# Patient Record
Sex: FEMALE | Race: BLACK OR AFRICAN AMERICAN | NOT HISPANIC OR LATINO | Employment: FULL TIME | URBAN - METROPOLITAN AREA
[De-identification: names, ages, dates, MRNs, and addresses within clinical notes are randomized per-mention and may not be internally consistent; named-entity substitution may affect disease eponyms.]

---

## 2017-05-23 ENCOUNTER — AMBULATORY - HEALTHEAST (OUTPATIENT)
Dept: SURGERY | Facility: CLINIC | Age: 37
End: 2017-05-23

## 2017-05-23 DIAGNOSIS — Z98.84 S/P BARIATRIC SURGERY: ICD-10-CM

## 2017-05-23 DIAGNOSIS — K91.2 OTHER AND UNSPECIFIED POSTSURGICAL NONABSORPTION: ICD-10-CM

## 2017-05-23 DIAGNOSIS — K90.9 UNSPECIFIED INTESTINAL MALABSORPTION: ICD-10-CM

## 2017-06-21 ENCOUNTER — AMBULATORY - HEALTHEAST (OUTPATIENT)
Dept: SURGERY | Facility: CLINIC | Age: 37
End: 2017-06-21

## 2017-06-21 DIAGNOSIS — Z98.84 S/P GASTRIC BYPASS: ICD-10-CM

## 2017-08-31 ENCOUNTER — OFFICE VISIT - HEALTHEAST (OUTPATIENT)
Dept: SURGERY | Facility: CLINIC | Age: 37
End: 2017-08-31

## 2017-08-31 DIAGNOSIS — R79.89 LOW SERUM VITAMIN A: ICD-10-CM

## 2017-08-31 DIAGNOSIS — D50.9 IRON DEFICIENCY ANEMIA: ICD-10-CM

## 2017-08-31 DIAGNOSIS — K91.2 POSTOPERATIVE MALABSORPTION: ICD-10-CM

## 2017-08-31 DIAGNOSIS — Z98.84 HX OF GASTRIC BYPASS: ICD-10-CM

## 2017-08-31 ASSESSMENT — MIFFLIN-ST. JEOR: SCORE: 1417.51

## 2018-07-17 ENCOUNTER — AMBULATORY - HEALTHEAST (OUTPATIENT)
Dept: SURGERY | Facility: CLINIC | Age: 38
End: 2018-07-17

## 2018-07-17 DIAGNOSIS — Z98.84 S/P BARIATRIC SURGERY: ICD-10-CM

## 2018-07-17 DIAGNOSIS — K90.9 INTESTINAL MALABSORPTION, UNSPECIFIED TYPE: ICD-10-CM

## 2018-07-17 DIAGNOSIS — K91.2 POSTSURGICAL MALABSORPTION: ICD-10-CM

## 2018-08-08 ENCOUNTER — AMBULATORY - HEALTHEAST (OUTPATIENT)
Dept: SURGERY | Facility: CLINIC | Age: 38
End: 2018-08-08

## 2018-08-08 DIAGNOSIS — Z98.84 S/P GASTRIC BYPASS: ICD-10-CM

## 2018-08-16 ENCOUNTER — OFFICE VISIT - HEALTHEAST (OUTPATIENT)
Dept: SURGERY | Facility: CLINIC | Age: 38
End: 2018-08-16

## 2018-08-16 ENCOUNTER — AMBULATORY - HEALTHEAST (OUTPATIENT)
Dept: LAB | Facility: CLINIC | Age: 38
End: 2018-08-16

## 2018-08-16 DIAGNOSIS — Z98.84 HX OF GASTRIC BYPASS: ICD-10-CM

## 2018-08-16 DIAGNOSIS — K90.9 INTESTINAL MALABSORPTION, UNSPECIFIED TYPE: ICD-10-CM

## 2018-08-16 DIAGNOSIS — Z98.84 S/P BARIATRIC SURGERY: ICD-10-CM

## 2018-08-16 DIAGNOSIS — K91.2 POSTSURGICAL MALABSORPTION: ICD-10-CM

## 2018-08-16 DIAGNOSIS — K59.00 CONSTIPATION: ICD-10-CM

## 2018-08-16 DIAGNOSIS — K91.2 POSTOPERATIVE MALABSORPTION: ICD-10-CM

## 2018-08-16 LAB
ALBUMIN SERPL-MCNC: 3.7 G/DL (ref 3.5–5)
ALP SERPL-CCNC: 77 U/L (ref 45–120)
ALT SERPL W P-5'-P-CCNC: 52 U/L (ref 0–45)
ANION GAP SERPL CALCULATED.3IONS-SCNC: 10 MMOL/L (ref 5–18)
AST SERPL W P-5'-P-CCNC: 49 U/L (ref 0–40)
BILIRUB SERPL-MCNC: 0.3 MG/DL (ref 0–1)
BUN SERPL-MCNC: 18 MG/DL (ref 8–22)
CALCIUM SERPL-MCNC: 9.4 MG/DL (ref 8.5–10.5)
CHLORIDE BLD-SCNC: 104 MMOL/L (ref 98–107)
CHOLEST SERPL-MCNC: 137 MG/DL
CO2 SERPL-SCNC: 25 MMOL/L (ref 22–31)
CREAT SERPL-MCNC: 0.74 MG/DL (ref 0.6–1.1)
ERYTHROCYTE [DISTWIDTH] IN BLOOD BY AUTOMATED COUNT: 12.9 % (ref 11–14.5)
FASTING STATUS PATIENT QL REPORTED: NORMAL
FERRITIN SERPL-MCNC: 23 NG/ML (ref 10–130)
FOLATE SERPL-MCNC: 14.7 NG/ML
GFR SERPL CREATININE-BSD FRML MDRD: >60 ML/MIN/1.73M2
GLUCOSE BLD-MCNC: 78 MG/DL (ref 70–125)
HBA1C MFR BLD: 5.2 % (ref 4.2–6.1)
HCT VFR BLD AUTO: 38.9 % (ref 35–47)
HDLC SERPL-MCNC: 60 MG/DL
HGB BLD-MCNC: 13.2 G/DL (ref 12–16)
LDLC SERPL CALC-MCNC: 68 MG/DL
MCH RBC QN AUTO: 29.8 PG (ref 27–34)
MCHC RBC AUTO-ENTMCNC: 33.9 G/DL (ref 32–36)
MCV RBC AUTO: 88 FL (ref 80–100)
PLATELET # BLD AUTO: 237 THOU/UL (ref 140–440)
PMV BLD AUTO: 10.8 FL (ref 8.5–12.5)
POTASSIUM BLD-SCNC: 3.8 MMOL/L (ref 3.5–5)
PROT SERPL-MCNC: 6.7 G/DL (ref 6–8)
PTH-INTACT SERPL-MCNC: 98 PG/ML (ref 10–86)
RBC # BLD AUTO: 4.43 MILL/UL (ref 3.8–5.4)
SODIUM SERPL-SCNC: 139 MMOL/L (ref 136–145)
TRIGL SERPL-MCNC: 44 MG/DL
VIT B12 SERPL-MCNC: 1860 PG/ML (ref 213–816)
WBC: 6.3 THOU/UL (ref 4–11)

## 2018-08-16 ASSESSMENT — MIFFLIN-ST. JEOR: SCORE: 1412.97

## 2018-08-17 LAB — 25(OH)D3 SERPL-MCNC: 43.2 NG/ML (ref 30–80)

## 2018-08-18 LAB
ANNOTATION COMMENT IMP: ABNORMAL
VIT A SERPL-MCNC: 0.21 MG/L (ref 0.3–1.2)
VITAMIN A (RETINYL PALMITATE): <0.02 MG/L (ref 0–0.1)
ZINC SERPL-MCNC: 73 UG/DL (ref 60–120)

## 2018-08-22 LAB — VIT B1 PYROPHOSHATE BLD-SCNC: 127 NMOL/L (ref 70–180)

## 2018-08-28 ENCOUNTER — AMBULATORY - HEALTHEAST (OUTPATIENT)
Dept: SURGERY | Facility: CLINIC | Age: 38
End: 2018-08-28

## 2018-08-28 DIAGNOSIS — R79.89 LOW SERUM VITAMIN A: ICD-10-CM

## 2018-08-28 DIAGNOSIS — K91.2 POSTOPERATIVE MALABSORPTION: ICD-10-CM

## 2020-08-18 ENCOUNTER — AMBULATORY - HEALTHEAST (OUTPATIENT)
Dept: SURGERY | Facility: CLINIC | Age: 40
End: 2020-08-18

## 2020-08-18 DIAGNOSIS — R79.89 LOW VITAMIN D LEVEL: ICD-10-CM

## 2020-08-18 DIAGNOSIS — K90.9 INTESTINAL MALABSORPTION: ICD-10-CM

## 2020-08-18 DIAGNOSIS — K91.2 POSTOPERATIVE MALABSORPTION: ICD-10-CM

## 2020-08-18 DIAGNOSIS — R79.89 LOW SERUM VITAMIN A: ICD-10-CM

## 2020-08-18 DIAGNOSIS — Z98.84 HX OF GASTRIC BYPASS: ICD-10-CM

## 2020-08-18 DIAGNOSIS — E60 ZINC DEFICIENCY: ICD-10-CM

## 2021-03-12 ENCOUNTER — OFFICE VISIT - HEALTHEAST (OUTPATIENT)
Dept: SURGERY | Facility: CLINIC | Age: 41
End: 2021-03-12

## 2021-03-12 DIAGNOSIS — Z98.84 HX OF GASTRIC BYPASS: ICD-10-CM

## 2021-03-12 DIAGNOSIS — Z98.84 BARIATRIC SURGERY STATUS: ICD-10-CM

## 2021-03-12 DIAGNOSIS — E66.3 OVERWEIGHT (BMI 25.0-29.9): ICD-10-CM

## 2021-03-12 DIAGNOSIS — K59.00 CONSTIPATION, UNSPECIFIED CONSTIPATION TYPE: ICD-10-CM

## 2021-03-12 DIAGNOSIS — K91.2 POSTOPERATIVE MALABSORPTION: ICD-10-CM

## 2021-03-12 ASSESSMENT — MIFFLIN-ST. JEOR: SCORE: 1444.72

## 2021-04-28 ENCOUNTER — AMBULATORY - HEALTHEAST (OUTPATIENT)
Dept: LAB | Facility: CLINIC | Age: 41
End: 2021-04-28

## 2021-04-28 DIAGNOSIS — K91.2 POSTOPERATIVE MALABSORPTION: ICD-10-CM

## 2021-04-28 DIAGNOSIS — Z98.84 HX OF GASTRIC BYPASS: ICD-10-CM

## 2021-04-28 LAB
ALBUMIN SERPL-MCNC: 3.8 G/DL (ref 3.5–5)
ALP SERPL-CCNC: 63 U/L (ref 45–120)
ALT SERPL W P-5'-P-CCNC: 44 U/L (ref 0–45)
ANION GAP SERPL CALCULATED.3IONS-SCNC: 9 MMOL/L (ref 5–18)
AST SERPL W P-5'-P-CCNC: 44 U/L (ref 0–40)
BILIRUB SERPL-MCNC: 0.4 MG/DL (ref 0–1)
BUN SERPL-MCNC: 16 MG/DL (ref 8–22)
CALCIUM SERPL-MCNC: 9.1 MG/DL (ref 8.5–10.5)
CHLORIDE BLD-SCNC: 107 MMOL/L (ref 98–107)
CO2 SERPL-SCNC: 25 MMOL/L (ref 22–31)
CREAT SERPL-MCNC: 0.79 MG/DL (ref 0.6–1.1)
ERYTHROCYTE [DISTWIDTH] IN BLOOD BY AUTOMATED COUNT: 13 % (ref 11–14.5)
FERRITIN SERPL-MCNC: 16 NG/ML (ref 10–130)
FOLATE SERPL-MCNC: 8.3 NG/ML
GFR SERPL CREATININE-BSD FRML MDRD: >60 ML/MIN/1.73M2
GLUCOSE BLD-MCNC: 69 MG/DL (ref 70–125)
HBA1C MFR BLD: 5.1 %
HCT VFR BLD AUTO: 37.6 % (ref 35–47)
HGB BLD-MCNC: 12.4 G/DL (ref 12–16)
MAGNESIUM SERPL-MCNC: 1.5 MG/DL (ref 1.8–2.6)
MCH RBC QN AUTO: 29 PG (ref 27–34)
MCHC RBC AUTO-ENTMCNC: 33 G/DL (ref 32–36)
MCV RBC AUTO: 88 FL (ref 80–100)
PLATELET # BLD AUTO: 212 THOU/UL (ref 140–440)
PMV BLD AUTO: 10.4 FL (ref 7–10)
POTASSIUM BLD-SCNC: 4.2 MMOL/L (ref 3.5–5)
PROT SERPL-MCNC: 6.4 G/DL (ref 6–8)
PTH-INTACT SERPL-MCNC: 61 PG/ML (ref 10–86)
RBC # BLD AUTO: 4.27 MILL/UL (ref 3.8–5.4)
SODIUM SERPL-SCNC: 141 MMOL/L (ref 136–145)
TSH SERPL DL<=0.005 MIU/L-ACNC: 0.86 UIU/ML (ref 0.3–5)
VIT B12 SERPL-MCNC: 368 PG/ML (ref 213–816)
WBC: 5.1 THOU/UL (ref 4–11)

## 2021-04-29 LAB — 25(OH)D3 SERPL-MCNC: 45.6 NG/ML (ref 30–80)

## 2021-05-01 LAB
COPPER SERPL-MCNC: 97.9 UG/DL (ref 80–155)
ZINC SERPL-MCNC: 67.3 UG/DL (ref 60–120)

## 2021-05-02 LAB
ANNOTATION COMMENT IMP: ABNORMAL
VIT A SERPL-MCNC: 0.23 MG/L (ref 0.3–1.2)
VITAMIN A (RETINYL PALMITATE): <0.02 MG/L (ref 0–0.1)

## 2021-05-03 LAB — VIT B1 PYROPHOSHATE BLD-SCNC: 102 NMOL/L (ref 70–180)

## 2021-05-06 ENCOUNTER — AMBULATORY - HEALTHEAST (OUTPATIENT)
Dept: SURGERY | Facility: CLINIC | Age: 41
End: 2021-05-06

## 2021-05-06 DIAGNOSIS — K91.2 POSTOPERATIVE MALABSORPTION: ICD-10-CM

## 2021-05-06 DIAGNOSIS — E50.9 VITAMIN A DEFICIENCY: ICD-10-CM

## 2021-05-24 ENCOUNTER — RECORDS - HEALTHEAST (OUTPATIENT)
Dept: ADMINISTRATIVE | Facility: CLINIC | Age: 41
End: 2021-05-24

## 2021-05-30 ENCOUNTER — RECORDS - HEALTHEAST (OUTPATIENT)
Dept: ADMINISTRATIVE | Facility: CLINIC | Age: 41
End: 2021-05-30

## 2021-05-31 VITALS — HEIGHT: 64 IN | BODY MASS INDEX: 28.51 KG/M2 | WEIGHT: 167 LBS

## 2021-06-01 VITALS — HEIGHT: 64 IN | WEIGHT: 166 LBS | BODY MASS INDEX: 28.34 KG/M2

## 2021-06-05 VITALS — HEIGHT: 64 IN | BODY MASS INDEX: 29.53 KG/M2 | WEIGHT: 173 LBS

## 2021-06-10 NOTE — PROGRESS NOTES
Patient called and is going to her PCP for follow-up and would like her bariatric labs sent so she can have drawn at that appointment.  Labs faxed to Jeimy Abad CNP's office at [phone].  Millie Romeo RN

## 2021-06-10 NOTE — PROGRESS NOTES
Pt called in with complaints of feeling really tired and wondering if her iron or vitamin D levels are low.  She saw  in August of 2016 with a full set of bariatric post op labs and was supposed to have her vit D, PTH and vitamin A levels rechecked in November but she lost the orders.  She also needs a f/u appt and will check with her insurance company first because she had to pay out of pocket last time.  I let her know that I will mail her lab orders to have a full set of bariatric labs checked as it's almost time for that.  Pt verbalized understanding and plans to have the labs drawn at her PCP clinic.      Beatriz Rivas RN, N  [facility]  P [phone]  F [phone]

## 2021-06-12 NOTE — PROGRESS NOTES
Bariatric Care Clinic Follow Up Visit for Previous Bariatric Surgery   Date of visit: 8/31/2017  Physician: Babatunde More MD  Primary Care is Jeimy Abad CNP.  Kaci Gavin   37 y.o.  female    Date of Surgery: 2/5/07  Initial Weight: 254 lbs  Initial BMI: na  Today's Weight:   Wt Readings from Last 1 Encounters:   08/31/17 167 lb (75.8 kg)     Body mass index is 28.67 kg/(m^2).  Weight: 167 lb (75.8 kg)     Assessment and Plan   Assessment: Kaci is a 37 y.o. year old female who is 10 years s/p  Gilma en Y Gastric Bypass with Dr. Taylor.  She has had a durable weight loss of 87 lbs since surgery.  Overall compliance with the [facility] has been recently improved.  She suffers from recurrent iron def anemia in combination due to lack of TWICE daily multivitamin with iron use and ongoing menses (much improved post ablation but still getting 2day menses monthly).  She's excelled in her exercise over the last year and has lost nearly 25 lbs through optimizing her bariatric technique and exercise regimen. She should be quite proud/pleased with her weight where it's at and she's enjoying excellent results. She's interested in arm skin removal and we discussed following up with plastics if wishing to investigate more.   .  Kaci Gavin feels that she has achieved her   preoperative goals for bariatric surgery.    Plan:  1. To optimize your iron levels, take your complete multivitamin with at least 18mg of iron TWICE a day (you could also look for a prenatal vitamin which would have 28mg of iron or more per serving and also take TWICE daily).   2. Keep using your body. This is a great weight for you.  3. If interested in skin removal from the arms/thighs, consider consulting with plastic surgery.      No diagnosis found.    No Follow-up on file.     Bariatric Surgery Review   Interim History/LifeChanges: working out a ton.    Patient Concerns: some low iron issues    Medication changes:  "none.     Vitamin Intake:   Multivitamin   only once daily mostly   Vitamin D  yes   Calcium  yes   Vit. B-12    yes     Habits:            Alcohol Intake  rare.   NSAID Use  avoids   Caffeine Use  no   Exercise  gym workouts every day mostly   CPAP Use:  no   Birth Control  Ablation, 2 days of menses             MBSAQIP  Surgeon: NATALI Taylor MD  Anticoag for PE/DVT since last visit: No  Patient complains of hernia: No  Readmit since last visit: No  Reoperation since last visit: No  Vomiting: Not at all  GERD req medication: No  Sleep Apnea: No  Hypertension req meds: No  Hyperlipidemia req meds: No  Diabetes: No    Symptoms  Hair Loss: Yes (has alopecia)  Reactive Hypoglycemia: (P) No  Abdominal Pain: (P) No  Nausea: (P) No  Heartburn: (P) No  Constipation: (P) Yes  Diarrhea: (P) No  Trouble Breathing or Chest Pain: (P) No  Leg Swelling: (P) No  Skin rashes under folds: (P) No                         LABS: \"Reviewed      LABS:  Lab Results   Component Value Date    WBC 7.4 08/08/2016    HGB 12.8 08/08/2016    HCT 37.8 08/08/2016    MCV 88 08/08/2016     08/08/2016      Lab Results   Component Value Date    IQLQQVNA16FS 22.8 (L) 08/08/2016    No results found for: HGBA1C   No results found for: CHOL Lab Results   Component Value Date     (H) 08/08/2016         Lab Results   Component Value Date    FERRITIN 35 08/08/2016      No results found for: HDL   Lab Results   Component Value Date    WZLRANHY34 342 08/08/2016    Lab Results   Component Value Date    40697 130 08/08/2016      No results found for: LDLCALC No results found for: TSH Lab Results   Component Value Date    FOLATE 11.7 08/08/2016      No results found for: TRIG Lab Results   Component Value Date    ALT 35 08/08/2016    AST 32 08/08/2016    ALKPHOS 74 08/08/2016    BILITOT 0.3 08/08/2016    No results found for: TESTOSTERONE     No components found for: CHOLHDL Lab Results   Component Value Date    7597 15.4 (L) 08/08/2016      " "@[facility](vitamin a: 1)@          Patient Profile   Social History     Social History Narrative     No narrative on file        Past Medical History   No past medical history on file.  Patient Active Problem List   Diagnosis     Hx of gastric bypass     Postoperative malabsorption     Low vitamin D level     Low serum vitamin A     Zinc deficiency     Secondary hyperparathyroidism, non-renal     Current Outpatient Prescriptions   Medication Sig Note     betamethasone dipropionate (DIPROLENE) 0.05 % ointment Apply topically 2 (two) times a day.      BIOTIN ORAL Take 1 tablet by mouth daily.       calcium citrate-vitamin D3 500 mg calcium -400 unit Chew Chew 1 tablet daily.      cholecalciferol, vitamin D3, (VITAMIN D3) 5,000 unit Tab Take 1 tablet by mouth daily.      cyanocobalamin, vitamin B-12, 1,000 mcg Subl Place 1,000 mcg under the tongue daily.      ferrous sulfate 325 (65 FE) MG tablet Take 1 tablet by mouth daily. 8/31/2017: Received from: External Pharmacy Received Sig: TK 1 T PO  ONCE D WITH A MEAL     MULTIVITS,CA,MINERALS/IRON/FA (ONE-A-DAY WOMENS FORMULA ORAL) Take 1 tablet by mouth daily.      SUMAtriptan (IMITREX) 25 MG tablet Take 25 mg by mouth every 2 (two) hours as needed for migraine.      VITAMIN C 250 MG tablet Take 1 tablet by mouth daily. 8/31/2017: Received from: External Pharmacy Received Sig: TK 1 T PO ONCE DAILY TO BE TAKEN WITH IRON SUPPLEMENT FO RIMPROVED ABSORPTION       Past Surgical History  She has a past surgical history that includes Gilma-en-y procedure (02/05/2007); Endometrial ablation; and Tubal ligation.     Examination   /56  Pulse 63  Temp 98.7  F (37.1  C)  Resp 16  Ht 5' 4\" (1.626 m)  Wt 167 lb (75.8 kg)  BMI 28.67 kg/m2  Height: 5' 4\" (1.626 m) (8/31/2017  3:29 PM)  Weight: 167 lb (75.8 kg) (8/31/2017  3:29 PM)  BMI (Calculated): 28.7 (8/31/2017  3:29 PM)  Body fat percentage: 29.8 (8/31/2017  3:29 PM)  NSAIDS: No (8/31/2017  3:29 PM)  Pain Scale: 0 " (8/31/2017  3:29 PM)  General:  Alert and ambulatory,   HEENT:  No conjunctival pallor, moist mucous Membranes, neck is supple.  Pulmonary:  Normal respiratory effort, no cough, no audible wheezes/crackles.  CV:  Regular rate and Rhythm, no murmurs, pulses 2 plus  Abdominal: soft, non tender. No guarding.  Extremities: loose tricep skin. No rash.  Skin:  Warm/dry, no pallor  Pscyh/Mood: pleasant, happy and fit.         Counseling:   We reviewed the important post op bariatric recommendations:  -eating 3 meals daily  -eating protein first, getting >60gm protein daily  -eating slowly, chewing food well  -avoiding/limiting calorie containing beverages  -drinking water 15-30 minutes before or after meals  -limiting restaurant or cafeteria eating to twice a week or less    We discussed the importance of restorative sleep and stress management in maintaining a healthy weight.  We discussed the National Weight Control Registry healthy weight maintenance strategies and ways to optimize metabolism.  We discussed the importance of physical activity including cardiovascular and strength training in maintaining a healthier weight.  We discussed the importance of life-long vitamin supplementation and life-long  follow-up.    Kaci was reminded that, to avoid marginal ulcers she should avoid tobacco at all, alcohol in excess, caffeine in excess, and NSAIDS (unless indicated for cardioprotection or othewise and opposed by a PPI).    At least 25 minutes was spent in direct consultation and over 50% of the time devoted to counseling regarding maximizing the benefits of her previous bariatric surgery while minimizing risks of nutritional or structural complications.    Babatunde More MD  [facility].  8/31/2017  3:55 PM

## 2021-06-15 NOTE — PROGRESS NOTES
"   Kaci Gavin is a 40 y.o. female who is being evaluated via a billable video visit.       How would you like to obtain your AVS? MyChart.  If dropped from the video visit, the video invitation should be resent by: Send to e-mail at: [email or web address]  Will anyone else be joining your video visit? No     Video Start Time: 11:00 am      Post-op Surgical Weight Loss Diet Evaluation     Assessment:  Pt presents for 14 year post-op RD visit, s/p RYGB on 2/5/07 with Dr. aTylor. Today we reviewed current eating habits and level of physical activity, and instructed on the changes that are required for successful bariatric outcomes.    Patient Progress: She has maintained a 70 lb weight loss since surgery. Overall, she is doing well with great food intake and exercise regimen. She has some nutrition questions today.    Pt's Initial Weight: 243 lbs  Weight: 173 lb (78.5 kg)  Weight loss from initial: 70  % Weight loss: 28.81 %  Weight (Patient Reported): 173 lb (78.5 kg)  Height (Patient Reported): 5' 4\" (1.626 m)  BMI (Based on Pt Reported Ht/Wt): 29.7      There is no height or weight on file to calculate BMI.    Patient Active Problem List   Diagnosis     Hx of gastric bypass     Postoperative malabsorption     Low vitamin D level     Low serum vitamin A     Zinc deficiency       Diabetes No    Vitamins   Reviewed with doctor today.    Constipation: yes    Diet Recall/Time:   We discussed nutrition goals for calories and protein that would help support her toward her overall goals.   Breakfast: Banana, 2 over easy eggs, green peppers, mushrooms, and breakfast sausage meat  Lunch: tuna, low carb tortilla, cheese, handful of blueberries  Dinner: Turkey burger zac, broccoli and sweet potatoes    Typical Snacks: Keto muffins or greek yogurt with flax seed and blueberries    Exercise: She works out 6 days per week, HIIT classes and Body Pump Class      PES statement:      (NC-1.4) Altered GI Function related to " "Alteration in gastrointestinal tract structure and/or function/ Decreased functional length of the GI tract as evidenced by Weight loss of 28.81% initial body weight; Gastric bypass surgery.    Intervention    Discussion  1. Discussed Post-Op Nutritional Guidelines for RYGB  2. Recommended to consume 15-20gm protein at 3 meals daily, along with protein supplement/\"planned protein containing snack\" of 15-30gm protein, to reach goal of 60-80 gm protein daily.  3. Educated on post-op vitamin regimen: Multi Vit + iron 2x/day, calcium citrate 400-600 mg 2x/day, 4846-8784 mcg of Sublingual B-12 daily, and 5000 IU Vitamin D3 daily (MVI and calcium can be taken at the same time BID)  4. Reviewed lean protein sources    Instructions  1. Include 15-20gm protein at each meal, along with protein supplement/\"planned protein containing snack\" of 15-30gm protein, to reach goal of 60-80 gm protein daily.  2. Increase fluid intake to 64oz daily: choose plain or calorie/carbonation-free beverages.  3. Incorporate daily structured activity, 30-60 minutes most days of the week  4. Recommended pt to start taking: Multi Vit + iron 2x/day, calcium citrate 400-600 mg 2x/day, 9027-8438 mcg of Sublingual B-12 daily, and 5000 IU Vitamin D3 daily. (MVI and calcium can be taken at the same time)  5. Read food labels more consistently: keeping total fat grams <10, total sugar grams <10, fiber >3gm per serving.  6. Increase vegetable/fruit intake, by having a vegetable or fruit with each meal daily.  7. Practice plate method: 1/2 plate lean/low fat protein source, vegetable/fruit, <25% of plate complex carbohydrates.  8. Separate fluids 30 minutes before/after meal times.  9. Practice eating off of smaller plates/bowls, chewing to applesauce consistency, taking 20-30 minutes to eat in a calm/relaxed environment without distractions of tv/email/cell phone.    Assessment/Plan:    Pt to follow up for 1 year  post-op visit with bariatrician "     Video-Visit Details    Type of service:  Video Visit    Video End Time (time video stopped): 11:24 am  Originating Location (pt. Location): Home    Distant Location (provider location):  [facility] [place]     Platform used for Video Visit: Kallie

## 2021-06-15 NOTE — PATIENT INSTRUCTIONS - HE
Plan:  1. Great work on maintaining a 70 lb weight reduction over the last 14 years. Continue good, mindful eating of protein first at 3 meals daily, chewing thoroughly and  beverages from meals.   2. Vitamin labs can be done this week:  [phone] to schedule at [facility].  3. Constipation plan:  To empty out you can use the dulcolax suppository once or twice daily for 2 days, or in the future if difficult emptying your bowels (use sparingly).  Once emptied, to help maintain good bowel movements, aim for 64-80 oz of water daily. After supper have a glass of metamucil and right before bed have 10ml Magnesium citrate.  You could consider also 2 prunes nightly before brushing your teeth.  4. Try 2 weeks of dairy free to see if gas improves. If so limit dairy or use Lactaid to aid digestion of dairy products. Cheese is usually fine, but for these next 2 weeks, no cheese.  5. Given constipating nature of iron sulfate, you could consider using Ferritt's Liquid Iron instead, iron protein succinylate, 45mg/serving for a month. We'll check iron levels with labs and adjust recommendations. The multivitamin regimen you're on is half of usual recommendations so I think as your multivitamin gets more regular (twice daily dosing or a Bariatric Specific brand like Celebrate One 45 or Bariatric Advantage Ultra Solo with Iron) your need for extra iron should go away as these provide 45mg of iron per day.  Message me if/when you make the switch.  6. Continue annual visits.  7. Continue excellent workout regimen.    [facility]ariat[facility]  Nutritional Guidelines  Gastric Bypass 18 Months Post Op and Beyond    General Guidelines and Helpful Hints:    Eat 3 meals per day + protein supplement(s). No snacks between meals.  o Do not skip meals.  This can cause overeating at the next meal and will prevent adequate protein and nutritional intake.    Aim for 60-80 grams of protein per day.  o Always eat your protein first.  This assists with optimal nutrition and helps you stay full longer.  o Depending on your portion size, you may need to drink approved protein supplement between meals to achieve protein goals. Follow recommendations of your Dietitian.     Eat your protein first, and then follow with fiber.   o It is not necessary to count your fiber, but 15-20 grams per day is recommended.    o Add fiber by including fruits, vegetables, whole grains, and beans.     Portions should remain about 1 cup per meal. Use measuring cups to be accurate.    Continue to use saucer/salad plates, infant/toddler silverware to keep portion sizes small and take small bites.    Eat S-L-O-W-L-Y to make each meal last 20-30 minutes. Always stop eating when satisfied.    Continue to use caution with foods containing skins, peels or membranes. Chew well!    Aim for 64 oz. of calorie-free fluids daily.  o Continue to avoid caffeine and carbonation. If you choose to drink alcohol, do so in moderation.   o Remember to avoid drinking during meals, 15-30 minutes before and 30 minutes after.    Exercise is odonnell for continued weight loss and weight maintenance. Aim for 30-60 minutes of physical activity most days of the week. Include cardiovascular and strength training.    If having trouble tolerating meat, try using a crock-pot, tinfoil tent, steamer or other moist cooking method to create tender meats. Add broth or low-fat gravy to help meat stay moist.     Avoid high sugar and high fat foods to prevent dumping syndrome.  o Check nutrition labels for less than 10 grams of sugar and less than 10 grams of fat per serving.    Continue Taking Vitamins/Minerals:  o 1965-8384 mcg of Sublingual B-12 daily  o 1 Multivitamin with Iron twice daily (chewable or swallow tabs)  o 500-600 mg Calcium Citrate twice daily (chewable or swallow tabs)  o 5000 IU Vitamin D3 daily    Sample Grocery List    Protein:    Fat free Greek or light yogurt (less than 10 grams  sugar)    Fat free or low-fat cottage cheese    String cheese or reduced fat cheese slices    Tuna, salmon, crab, egg, or chicken salad made with light or fat free mayonnaise    Egg or Egg Substitute    Lean/extra lean turkey, beef, bison, venison (ground, sirloin, round, flank)    Pork loin or tenderloin (grilled, baked, broiled)    Fish such as salmon, tuna, trout, tilapia, etc. (grilled, baked, broiled)    Tender cuts of lean (skinless) turkey or chicken    Lean deli meats: turkey, lean ham, chicken, lean roast beef    Beans such as kidney, garbanzo, black, gallardo, or low-fat/fat free refried beans    Peanut butter (natural preferred). Limit to 1 Tbsp. per day.    Low-fat meatloaf (made with lean ground beef or turkey)    Sloppy Joes made with low-sugar ketchup and lean ground beef or turkey    Soy or vegetable protein (i.e. vegan crumbles, soy/veggie burger, tofu)    Hummus    Vegetables:    Fresh: cooked or raw (as tolerated)    Frozen vegetables    Canned vegetables (low sodium or no salt added, rinse before cooking/eating)    (Ok to have skins/peels/membranes/seeds - just chew well)    Fruits:    Fresh fruit    Frozen fruit (no sugar added)    Canned fruit (packed in its own juice, NOT syrup)    (Ok to have skins/peels/membranes/seeds - just chew well)    Starch:    Unsweetened whole-grain hot cereal (or high fiber cold cereal, dry)    Toasted whole wheat bread or [place] Thins    Whole grain crackers    Baked /boiled/mashed potato/sweet potato    Cooked whole grain pasta, brown rice, or other cooked whole grains    Starchy vegetables: corn, peas, winter squash    Protein Supplement:     Ready to drink protein shake with:  o 15-30 grams protein per serving  o Less than 10 grams total carbohydrate per serving     Protein powder mixed with:  o  Skim or 1% milk  o Low fat or fat free Lactaid milk, plain or no sugar added soymilk  o Water     Fats: (use in moderation)    1 teaspoon of soft tub margarine    1  teaspoon olive oil, canola oil, or peanut oil    1 tablespoon of low-fat garcia or salad dressing     Sample Menu for 18+ months after Gastric Bypass    You do NOT need to eat/drink the full portion sizes listed below  Always stop when you are satisfied    Breakfast   cup 1% cottage cheese     cup mixed berries   Lunch 2 oz lean roast beef on   [place] Thin with 1 tsp. light garcia    small tomato, chopped, mixed with 1 tsp. light vinaigrette dressing   Supplement Approved protein supplement (if needed between meals)   Dinner 2 oz grilled salmon    cup salad greens with 1 tsp. light salad dressing and 1 tsp. ground flax seed    cup quinoa or brown rice     Breakfast   cup egg substitute with   cup sautéed chopped vegetables  2 light [place] Krisp crackers   Lunch Tuna Melt:   cup tuna mixed with 1 tsp. light garcia over   [place] Thin. Top with 2-3 slices cucumber and 1 oz slice of low fat cheese   Supplement 1 cup skim milk (if needed between meals)   Dinner 3 oz  grilled, broiled, or baked seasoned skinless chicken breast    cup asparagus     Breakfast   cup plain oatmeal made with skim or 1% milk with 1 Tbsp. flavored/unflavored protein powder added  1 mozzarella string cheese   Lunch 2 oz deli turkey breast  1/3 cup salad with 1 tsp. light salad dressing, 1/8 of a whole avocado and 1 Tbsp. sunflower seeds   Dinner 3 oz. pork loin made in a crock pot, seasoned with a spice rub    cup cooked carrots   Supplement Approved protein supplement (if needed between meals)     Breakfast 1 cup breakfast casserole made with egg substitute, turkey sausage,  and steamed, chopped bell peppers   Supplement  1 cup light Greek yogurt (if needed between meals)   Lunch 2 oz. teriyaki turkey    cup mashed sweet potato with 1-2 spritzes of spray butter (like [place])    cup fresh pineapple   Dinner 3 oz low fat meatloaf    cup roasted garlic zucchini     Breakfast   cup leftover breakfast casserole    cup no sugar added applesauce with 1  Tbsp. unflavored protein powder and a sprinkle of cinnamon    Lunch 3 oz shrimp with 1-2 Tbsp. low-sugar cocktail sauce for dipping    c. whole wheat pasta drizzled with   tsp. olive oil   Supplement 1 cup skim/1% milk with scoop of protein powder (if needed between meals)   Dinner Grilled, seasoned kebob with 2 oz lean beef and   cup vegetables     Breakfast Breakfast pizza:   [place] Thin spread with 1 Tbsp. low sugar spaghetti sauce,   cup shredded low fat cheese, melted and 1 slice of [place] flores     cup fresh fruit mixed with chopped almonds   Lunch   cup black bean soup  4-5 whole grain crackers   Dinner 3 oz  tilapia with lemon pepper seasoning    cup stewed tomatoes   Supplement 1 string cheese (if needed between meals)     Breakfast 2 hard boiled eggs (discard 1 egg yolk)    whole wheat English Muffin with 1 tsp. low sugar jelly   Lunch   cup leftover black bean soup topped with 1-2 Tbsp. low fat cheese  2-3 light [place] Krisp crackers   Supplement Approved protein supplement (if needed between meals)   Dinner 3 oz sirloin steak    cup steamed broccoli       LEAN PROTEIN SOURCES  Getting 20-30 grams of protein, 3 meals daily, is appropriate for most people, some need more but more than about 40 grams per meal is not useful.  General rule is drinking one ounce of water per gram of protein eaten over the course of the day:  70 grams of protein each day, drink 70 oz of water.  Protein Source Portion Calories Grams of Protein                           Nonfat, plain Greek yogurt    (10 grams sugar or less) 3/4 cup (6 oz)  12-17   Light Yogurt (10 grams sugar or less) 3/4 cup (6 oz)  6-8   Protein Shake 1 shake 110-180 15-30   Skim/1% Milk or lactose-free milk 1 cup ( 8 oz)  8   Plain or light, flavored soymilk 1 cup  7-8   Plain or light, hemp milk 1 cup 110 6   Fat Free or 1% Cottage Cheese 1/2 cup 90 15   Part skim ricotta cheese 1/2 cup 100 14   Part skim or reduced fat cheese slices  1 ounce 65-80 8     Mozzarella String Cheese 1 80 8   Canned tuna, chicken, crab or salmon  (canned in water)  1/2 cup 100 15-20   White fish (broiled, grilled, baked) 3 ounces 100 21   [place]/Tuna (broiled, grilled, baked) 3 ounces 150-180 21   Shrimp, Scallops, Lobster, Crab 3 ounces 100 21   Pork loin, Pork Tenderloin 3 ounces 150 21   Boneless, skinless chicken /turkey breast                          (broiled, grilled, baked) 3 ounces 120 21   [place], [place], [place], and Venison 3 ounces 120 21   Lean cuts of red meat and pork (sirloin,   round, tenderloin, flank, ground 93%-96%) 3 ounces 170 21   Lean or Extra Lean Ground Turkey 1/2 cup 150 20   90-95% Lean [place] Burger 1 chente 140-180 21   Low-fat casserole with lean meat 3/4 cup 200 17   Luncheon Meats                                                        (turkey, lean ham, roast beef, chicken) 3 ounces 100 21   Egg (boiled, poached, scrambled) 1 Egg 60 7   Egg Substitute 1/2 cup 70 10   Nuts (limit to 1 serving per day)  3 Tbsp. 150 7   Nut Tome (peanut, almond)  Limit to 1 serving or less daily 1 Tbsp. 90 4   Soy Burger (varies) 1  15   Garbanzo, Black, Green Beans 1/2 cup 110 7   Refried Beans 1/2 cup 100 7   Kidney and Lima beans 1/2 cup 110 7   Tempeh 3 oz 175 18   Vegan crumbles 1/2 cup 100 14   Tofu 1/2 cup 110 14   Chili (beans and extra lean beef or turkey) 1 cup 200 23   Lentil Stew/Soup 1 cup 150 12   Black Bean Soup 1 cup 175 12

## 2021-06-15 NOTE — PROGRESS NOTES
"Kaci Gavin is 40 y.o.  female who presents for a billable video visit today.    How would you like to obtain your AVS? E-Mail (Inform patient AVS not encrypted with this option).  If dropped from the video visit, the video invitation should be resent by: Text to cell phone: [phone]  Will anyone else be joining your video visit? No      Video Start Time: 10:16 AM    Provider Notes:   Bariatric Care Clinic Follow Up Visit for Previous Bariatric Surgery   Date of visit: 3/12/2021  Physician: Babatunde More MD  Primary Care is Jeimy Abad, NAKUL.  Kaci Gavin   40 y.o.  female    Date of Surgery: 2/5/07  Initial Weight: 243 lbs  Initial BMI: n/a  Today's Weight:   Wt Readings from Last 1 Encounters:   03/12/21 173 lb (78.5 kg)     Body mass index is 29.7 kg/m .  Weight: 173 lb (78.5 kg)  Weight (Patient Reported): 173 lb (78.5 kg)  Height (Patient Reported): 5' 4\" (1.626 m)  BMI (Based on Pt Reported Ht/Wt): 29.7       Assessment and Plan   Assessment: Kaci is a 40 y.o. year old female who is 14 years s/p  Gilma en Y Gastric Bypass with Dr. Taylor.  She has had a durable weight loss of 70 lbs since surgery.  Overall compliance with the [facility] has been lost to follow up for awhile. Today she is concerned about constipation so bowel regimen discussed/iron switch.. Weight stabliization is good, just slightly up from 2018 visit (166 lbs). Vitamin use is good but multivitamin should be doubled or bariatric specific brand to avoid iron deficiency down the road.   .  Kaci Gavin feels that she has achieved her   preoperative goals for bariatric surgery.    Plan:  1. Great work on maintaining a 70 lb weight reduction over the last 14 years. Continue good, mindful eating of protein first at 3 meals daily, chewing thoroughly and  beverages from meals.   2. Vitamin labs can be done this week:  [phone] to schedule at [facility].  3. Constipation plan:  To empty out " "you can use the dulcolax suppository once or twice daily for 2 days, or in the future if difficult emptying your bowels (use sparingly).  Once emptied, to help maintain good bowel movements, aim for 64-80 oz of water daily. After supper have a glass of metamucil and right before bed have 10ml Magnesium citrate.  You could consider also 2 prunes nightly before brushing your teeth.  4. Try 2 weeks of dairy free to see if gas improves. If so limit dairy or use Lactaid to aid digestion of dairy products. Cheese is usually fine, but for these next 2 weeks, no cheese.  5. Given constipating nature of iron sulfate, you could consider using Ferritt's Liquid Iron instead, iron protein succinylate, 45mg/serving for a month. We'll check iron levels with labs and adjust recommendations. The multivitamin regimen you're on is half of usual recommendations so I think as your multivitamin gets more regular (twice daily dosing or a Bariatric Specific brand like Celebrate One 45 or Bariatric Advantage Ultra Solo with Iron) your need for extra iron should go away as these provide 45mg of iron per day.  Message me if/when you make the switch.  6. Continue annual visits.  7. Continue excellent workout regimen.    No diagnosis found.    No follow-ups on file.     Bariatric Surgery Review   Interim History/LifeChanges: stable    Patient Concerns: having constipation issues that are severe. Trying some laxatives. Very routine diet. Tried some \"colon cleanse\". Had normal colonoscopy..    Medication changes: stable.    Any Hunger/Appetite issues?  no.    GERD sx at all? no. Follow up Endoscopy to evaluate health of esophagus/stomach will be deferred. Typically, EGD is recommended within the first 3 years following surgery and periodically for surveillance thereafter or for symptom evaluation as needed.    Vitamin Intake:   Multivitamin   [facility] women's ultra devan: 18mg. Once daily. zinc is 15mg. Vitamin A 5000IU, thiamine 15mg.   Vitamin D  " 5000 plus 800.   Calcium  800 mg w/ some D, 400mag and 800IU of D3 per tab..   B12   Yes also in energy drink SL.   Extra Supplments? Iron sulfate: 65mg per serving..     Habits:                Nicotine Use? no.   Alcohol Intake  no.   NSAID Use  no.   Caffeine Use  occasional. After workouts uses Bang..   Aerobic Exercise?  6 days weekly..   Strength Training? same.   Birth Control  n/a.   Bone Density Follow up:  With an American Bone Health Fracture Risk Calculator score of Moderate or High, we would recommend DXA scan per our protocol. A Low risk for 45-54 year old can defer DXA scan until age 55 barring extenuating circumstances.  Over 55 years old, we would recommend check between year 2 and 3 post bariatric surgery.                                  LABS: ordered      LABS:  Lab Results   Component Value Date    WBC 6.3 08/16/2018    HGB 13.2 08/16/2018    HCT 38.9 08/16/2018    MCV 88 08/16/2018     08/16/2018      Lab Results   Component Value Date    WSHFSSJZ08RP 43.2 08/16/2018    Lab Results   Component Value Date    HGBA1C 5.2 08/16/2018      Lab Results   Component Value Date    CHOL 137 08/16/2018    Lab Results   Component Value Date    PTH 98 (H) 08/16/2018         Lab Results   Component Value Date    FERRITIN 23 08/16/2018      Lab Results   Component Value Date    HDL 60 08/16/2018      Lab Results   Component Value Date    AXXUXJCK32 1,860 (H) 08/16/2018    Lab Results   Component Value Date    68129 130 08/08/2016      Lab Results   Component Value Date    LDLCALC 68 08/16/2018    No results found for: TSH Lab Results   Component Value Date    FOLATE 14.7 08/16/2018      Lab Results   Component Value Date    TRIG 44 08/16/2018    Lab Results   Component Value Date    ALT 52 (H) 08/16/2018    AST 49 (H) 08/16/2018    ALKPHOS 77 08/16/2018    BILITOT 0.3 08/16/2018    No results found for: TESTOSTERONE     No components found for: CHOLHDL Lab Results   Component Value Date    7597 15.4 (L)  "08/08/2016      @siri(vitamin a: 1)@          Patient Profile   Social History     Social History Narrative     Not on file        Past Medical History   No past medical history on file.  Patient Active Problem List   Diagnosis     Hx of gastric bypass     Postoperative malabsorption     Low vitamin D level     Low serum vitamin A     Zinc deficiency     Current Outpatient Medications   Medication Sig Note     betamethasone dipropionate (DIPROLENE) 0.05 % ointment Apply topically 2 (two) times a day.      calcium citrate-vitamin D3 500 mg calcium -400 unit Chew Chew 1 tablet daily. 8/16/2018: Intermittently used, about 2x weekly.     cholecalciferol, vitamin D3, (VITAMIN D3) 5,000 unit Tab Take 1 tablet by mouth daily.      cyanocobalamin, vitamin B-12, 1,000 mcg Subl Place 1,000 mcg under the tongue daily.      cyclobenzaprine (FLEXERIL) 5 MG tablet Take 5 mg by mouth.      ferrous sulfate 325 (65 FE) MG tablet Take 1 tablet by mouth daily. 8/31/2017: Received from: External Pharmacy Received Sig: TK 1 T PO  ONCE D WITH A MEAL     hydrOXYzine HCL (ATARAX) 25 MG tablet Take 50 mg by mouth.      MULTIVITS,CA,MINERALS/IRON/FA (ONE-A-DAY WOMENS FORMULA ORAL) Take 1 tablet by mouth daily.        Past Surgical History  She has a past surgical history that includes Gilma-en-y procedure (02/05/2007); Endometrial ablation; and Tubal ligation.     Examination   Ht 5' 4\" (1.626 m)   Wt 173 lb (78.5 kg)   BMI 29.70 kg/m    Height: 5' 4\" (1.626 m) (3/12/2021 10:00 AM)  Weight: 173 lb (78.5 kg) (3/12/2021 10:00 AM)  BMI (Calculated): 29.7 (3/12/2021 10:00 AM)    General:  Alert and ambulatory,   HEENT:  No conjunctival pallor, moist mucous Membranes, neck is thin  Pulmonary:  Normal respiratory effort, no cough,   Abdominal: no pain complaints  Extremities: no tremor  Skin:  Normal complexion for ethnicity  Pscyh/Mood: smiling/happy affect.         Counseling:   We reviewed the important post op bariatric " recommendations:  -eating 3 meals daily  -eating protein first, getting >60gm protein daily  -eating slowly, chewing food well  -avoiding/limiting calorie containing beverages  -drinking water 15-30 minutes before or after meals  -limiting restaurant or cafeteria eating to twice a week or less    We discussed the importance of restorative sleep and stress management in maintaining a healthy weight.  We discussed the National Weight Control Registry healthy weight maintenance strategies and ways to optimize metabolism.  We discussed the importance of physical activity including cardiovascular and strength training in maintaining a healthier weight and help long term bone health.  We discussed the importance of life-long vitamin supplementation for avoiding malnourishment and related disease and the need for life-long  follow-up for maintenance of her bariatric tool.    Kaci was reminded that, to avoid marginal ulcers she should avoid tobacco at all, alcohol in excess, caffeine in excess, and NSAIDS (unless indicated for cardioprotection or othewise and opposed by a PPI).    Medical Decision Makin minutes spent on the date of the encounter doing chart review, history and exam, documentation and further activities as noted above    Babatunde More MD  [facility].  3/12/2021  10:16 AM               Video-Visit Details    Type of service:  Video Visit    Video End Time (time video stopped): 10:50 AM  Originating Location (pt. Location): Home    Distant Location (provider location):  [facility] Mobile     Platform used for Video Visit: Edicy

## 2021-06-19 NOTE — PROGRESS NOTES
[facility] Care Clinic Follow Up Visit for Previous Bariatric Surgery   Date of visit: 8/16/2018  Physician: Babatunde More MD  Primary Care is Jeimy Abad CNP.  Kaci Gavin   38 y.o.  female    Date of Surgery: 2/5/07  Initial Weight: 243 lbs  Initial BMI: na  Today's Weight:   Wt Readings from Last 1 Encounters:   08/16/18 166 lb (75.3 kg)     Body mass index is 28.49 kg/(m^2).  Weight: 166 lb (75.3 kg)     Assessment and Plan   Assessment: Kaci is a 38 y.o. year old female who is 11.5 years s/p  Gilma en Y Gastric Bypass with Dr. Taylor.  She has had a durable weight loss of 77 lbs since surgery.  Overall compliance with the [facility] has been excellent of late, now on full vitamin regimen. Just had labs drawn prior to visit so will see how they look over the next week and adjust iron needs accordingly.  She's had some constipation in her life, worked up outside the bariatric clinic w/ negative colonoscopy by her report. Will work to increase water, fiber, trial of magnesium and stool softeners prn. Her iron supplementation could be contributing and we'll see if we can back off on the iron or if ongoing higher dose/vitron C is needed.  She can use prenatal vitamins two times a day to reduce her chance of iron deficiency going forward, or consider using them for the 2 weeks after her menses.  .  Kaci Gavin feels that she has achieved her   preoperative goals for bariatric surgery.    Plan:  1.  Great job with ongoing, wonderful weight maintenance, down 77 lbs from your preop weight in 2007.  2. Continue good vitamin usage, consider 1-2 teaspoons of mag citrate in the evening to help with some of the constipation issues. Hydrating well and continued good exercise and vegetable intake helps greatly. Stool softeners and fiber and goal of 64 oz of water daily helps greatly.  3. I'll call you when all your labs are back.      1. Postoperative malabsorption     2. Hx of gastric  bypass     3. Constipation         Return in about 1 year (around 8/16/2019) for Recheck.     Bariatric Surgery Review   Interim History/LifeChanges: step son is going off to college and her son is last year in .     Patient Concerns: some constipation.    Medication changes: see list    Appetite (1-10): doing well    GERD sx at all? no    Vitamin Intake:   Multivitamin   yes   Vitamin D  yes    Calcium  twice weekly on average   Vit. B-12    yes     Habits:            Alcohol Intake  none.    NSAID Use  avoids   Caffeine Use  none.   Exercise  gym workouts 5 days weekly. Walks for lunch   CPAP Use:  na   Birth Control  ablation previously.                                            LABS: drawn and pending      LABS:  Lab Results   Component Value Date    WBC 6.3 08/16/2018    HGB 13.2 08/16/2018    HCT 38.9 08/16/2018    MCV 88 08/16/2018     08/16/2018      Lab Results   Component Value Date    XJPBEXFB96HT 22.8 (L) 08/08/2016    Lab Results   Component Value Date    HGBA1C 5.2 08/16/2018      Lab Results   Component Value Date    CHOL 137 08/16/2018    Lab Results   Component Value Date    PTH 98 (H) 08/16/2018         Lab Results   Component Value Date    FERRITIN 23 08/16/2018      Lab Results   Component Value Date    HDL 60 08/16/2018      Lab Results   Component Value Date    YTSOGOFP83 1860 (H) 08/16/2018    Lab Results   Component Value Date    50900 130 08/08/2016      Lab Results   Component Value Date    LDLCALC 68 08/16/2018    No results found for: TSH Lab Results   Component Value Date    FOLATE 14.7 08/16/2018      Lab Results   Component Value Date    TRIG 44 08/16/2018    Lab Results   Component Value Date    ALT 52 (H) 08/16/2018    AST 49 (H) 08/16/2018    ALKPHOS 77 08/16/2018    BILITOT 0.3 08/16/2018    No results found for: TESTOSTERONE     No components found for: CHOLHDL Lab Results   Component Value Date    7597 15.4 (L) 08/08/2016      @siri(vitamin a: 1)@          Patient  "Profile   Social History     Social History Narrative        Past Medical History   No past medical history on file.  Patient Active Problem List   Diagnosis     Hx of gastric bypass     Postoperative malabsorption     Low vitamin D level     Low serum vitamin A     Zinc deficiency     Current Outpatient Prescriptions   Medication Sig Note     betamethasone dipropionate (DIPROLENE) 0.05 % ointment Apply topically 2 (two) times a day.      calcium citrate-vitamin D3 500 mg calcium -400 unit Chew Chew 1 tablet daily. 8/16/2018: Intermittently used, about 2x weekly.     cholecalciferol, vitamin D3, (VITAMIN D3) 5,000 unit Tab Take 1 tablet by mouth daily.      cyanocobalamin, vitamin B-12, 1,000 mcg Subl Place 1,000 mcg under the tongue daily.      ferrous sulfate 325 (65 FE) MG tablet Take 1 tablet by mouth daily. 8/31/2017: Received from: External Pharmacy Received Sig: TK 1 T PO  ONCE D WITH A MEAL     MULTIVITS,CA,MINERALS/IRON/FA (ONE-A-DAY WOMENS FORMULA ORAL) Take 1 tablet by mouth daily.      BIOTIN ORAL Take 1 tablet by mouth daily.         Past Surgical History  She has a past surgical history that includes Gilma-en-y procedure (02/05/2007); Endometrial ablation; and Tubal ligation.     Examination   /53  Pulse (!) 53  Resp 18  Ht 5' 4\" (1.626 m)  Wt 166 lb (75.3 kg)  SpO2 100%  BMI 28.49 kg/m2  Height: 5' 4\" (1.626 m) (8/16/2018  1:02 PM)  Weight: 166 lb (75.3 kg) (8/16/2018  1:02 PM)  BMI (Calculated): 28.5 (8/16/2018  1:02 PM)  SpO2: 100 % (8/16/2018  1:02 PM)  Body fat percentage: 29.8 (8/31/2017  3:29 PM)  NSAIDS: No (8/31/2017  3:29 PM)  Pain Scale: 0 (8/31/2017  3:29 PM)  General:  Alert and ambulatory,   HEENT:  No conjunctival pallor, moist mucous Membranes, neck is thin.  Pulmonary:  Normal respiratory effort, no cough, no audible wheezes/crackles.  CV:  Regular rate and Rhythm, no murmurs, pulses 2 plus radial  Abdominal: flat/soft, non obese. No tenderness to palpation, no palpable " masses.  Extremities: no edema. Good muscle tone c/w regular workouts  Skin:  No pallor or jaundice in this  female  Pscyh/Mood: energetic/happy affect.         Counseling:   We reviewed the important post op bariatric recommendations:  -eating 3 meals daily  -eating protein first, getting >60gm protein daily  -eating slowly, chewing food well  -avoiding/limiting calorie containing beverages  -drinking water 15-30 minutes before or after meals  -limiting restaurant or cafeteria eating to twice a week or less    We discussed the importance of restorative sleep and stress management in maintaining a healthy weight.  We discussed the National Weight Control Registry healthy weight maintenance strategies and ways to optimize metabolism.  We discussed the importance of physical activity including cardiovascular and strength training in maintaining a healthier weight.  We discussed the importance of life-long vitamin supplementation and life-long  follow-up.    Kaci was reminded that, to avoid marginal ulcers she should avoid tobacco at all, alcohol in excess, caffeine in excess, and NSAIDS (unless indicated for cardioprotection or othewise and opposed by a PPI).    At least 25 minutes was spent in direct consultation and over 50% of the time devoted to counseling regarding maximizing the benefits of her previous bariatric surgery while minimizing risks of nutritional or structural complications.    Babatunde More MD  [facility].  8/16/2018  1:44 PM

## 2021-06-19 NOTE — PROGRESS NOTES
11 yrs post op lab orders placed for patient and sent to patient via mail in preparation for appointment with  in August.    Beatriz Rivas RN, [facility]  P [phone]  F [phone]

## 2021-09-05 ENCOUNTER — HOSPITAL ENCOUNTER (EMERGENCY)
Facility: CLINIC | Age: 41
Discharge: HOME | End: 2021-09-05
Attending: EMERGENCY MEDICINE | Admitting: EMERGENCY MEDICINE
Payer: COMMERCIAL

## 2021-09-05 ENCOUNTER — APPOINTMENT (OUTPATIENT)
Dept: RADIOLOGY | Facility: CLINIC | Age: 41
End: 2021-09-05
Attending: EMERGENCY MEDICINE
Payer: COMMERCIAL

## 2021-09-05 VITALS
DIASTOLIC BLOOD PRESSURE: 73 MMHG | HEIGHT: 65 IN | RESPIRATION RATE: 16 BRPM | OXYGEN SATURATION: 98 % | WEIGHT: 165 LBS | HEART RATE: 63 BPM | BODY MASS INDEX: 27.49 KG/M2 | SYSTOLIC BLOOD PRESSURE: 121 MMHG | TEMPERATURE: 99.5 F

## 2021-09-05 DIAGNOSIS — S62.662A CLOSED NONDISPLACED FRACTURE OF DISTAL PHALANX OF RIGHT MIDDLE FINGER, INITIAL ENCOUNTER: ICD-10-CM

## 2021-09-05 PROCEDURE — 73140 X-RAY EXAM OF FINGER(S): CPT | Mod: RT

## 2021-09-05 PROCEDURE — 250N000013 HC RX MED GY IP 250 OP 250 PS 637: Performed by: EMERGENCY MEDICINE

## 2021-09-05 PROCEDURE — 99284 EMERGENCY DEPT VISIT MOD MDM: CPT

## 2021-09-05 PROCEDURE — 29130 APPL FINGER SPLINT STATIC: CPT | Mod: F7

## 2021-09-05 RX ORDER — HYDROCODONE BITARTRATE AND ACETAMINOPHEN 5; 325 MG/1; MG/1
1 TABLET ORAL EVERY 6 HOURS PRN
Qty: 8 TABLET | Refills: 0 | Status: SHIPPED | OUTPATIENT
Start: 2021-09-05 | End: 2021-09-08

## 2021-09-05 RX ORDER — HYDROCODONE BITARTRATE AND ACETAMINOPHEN 5; 325 MG/1; MG/1
1 TABLET ORAL ONCE
Status: COMPLETED | OUTPATIENT
Start: 2021-09-05 | End: 2021-09-05

## 2021-09-05 RX ORDER — IBUPROFEN 600 MG/1
600 TABLET, FILM COATED ORAL ONCE
Status: COMPLETED | OUTPATIENT
Start: 2021-09-05 | End: 2021-09-05

## 2021-09-05 RX ADMIN — IBUPROFEN 600 MG: 600 TABLET, FILM COATED ORAL at 22:00

## 2021-09-05 RX ADMIN — HYDROCODONE BITARTRATE AND ACETAMINOPHEN 1 TABLET: 5; 325 TABLET ORAL at 23:03

## 2021-09-05 ASSESSMENT — MIFFLIN-ST. JEOR: SCORE: 1419.32

## 2021-09-06 NOTE — ED NOTES
Splint applied to finger, and wrapped with Kerlix to keep in place. Pt instructed on use of the splint, and discharge instructions. No questions at this time.

## 2021-09-06 NOTE — DISCHARGE INSTRUCTIONS
The x-rays show that you have a comminuted fracture in the small bone in the end of your middle finger.  Continue to wear the finger splint for the next 4 to 6 weeks.  Use the prescribed hydrocodone as needed for any further pain in addition to over-the-counter ibuprofen.  Follow-up with your primary care physician or orthopedic physician for reevaluation.  Return back to ED sooner for any worsening pain or any other new or concerning symptoms.

## 2021-09-06 NOTE — ED PROVIDER NOTES
EMERGENCY DEPARTMENT ENCOUNTER      NAME: Kaci Gavin  AGE: 41 year old female  YOB: 1980  MRN: [id number]  EVALUATION DATE & TIME: 2021  9:12 PM    PCP: Jeimy Abad    ED PROVIDER: Ekaterina Frankel D.O.      CHIEF COMPLAINT:  Chief Complaint   Patient presents with     Finger     Right 3rd finger injury          FINAL IMPRESSION:  1. Closed nondisplaced fracture of distal phalanx of right middle finger, initial encounter          ED COURSE & MEDICAL DECISION MAKIN year old female scented to the ED for evaluation following an injury to the distal tip of her right middle finger.  The patient states that she caught the tip of her finger in a car door just prior to arrival.  She denied any other trauma or injury.  Upon arrival to the ED the patient was noted to be hemodynamically stable.  The patient was slightly uncomfortable appearing but she did not appear to be in any obvious distress.  On exam the patient had tenderness palpation located over the distal tip of the right index finger.  However, there were no open wounds, swelling, or ecchymosis, and there was no nailbed trauma noted.  Capillary refill and sensation were normal in the affected finger.  No other signs of trauma were noted elsewhere in the right arm.  Following her initial evaluation the patient was given ibuprofen for pain control.    X-rays show a comminuted nondisplaced fracture in the distal phalanx of the right middle finger.    Patient was reevaluated informed of the x-ray results.  The patient reported persistent pain despite receiving the ibuprofen.  She was then given hydrocodone for further pain control.  Patient was educated about her fractures and reassured.  She was placed into a finger splint here in the ED which she will need to wear for the next 4 to 6 weeks.  The patient was instructed to follow-up with her primary care physician or orthopedic physician for reevaluation or to return back to ED sooner for any  worsening pain, numbness, or any other new or concerning symptoms.    9:27 PM I met with the patient to gather history and to perform my initial exam. I discussed the plan for care while in the Emergency Department. PPE (gloves, glasses, surgical mask) was worn during patient encounters.       At the conclusion of the encounter I discussed the results of all of the tests and the disposition. The questions were answered. The patient or family acknowledged understanding and was agreeable with the care plan.     MEDICATIONS GIVEN IN THE EMERGENCY:  Medications   ibuprofen (ADVIL/MOTRIN) tablet 600 mg (600 mg Oral Given 9/5/21 2200)   HYDROcodone-acetaminophen (NORCO) 5-325 MG per tablet 1 tablet (1 tablet Oral Given 9/5/21 2303)       NEW PRESCRIPTIONS STARTED AT TODAY'S ER VISIT:  New Prescriptions    HYDROCODONE-ACETAMINOPHEN (NORCO) 5-325 MG TABLET    Take 1 tablet by mouth every 6 hours as needed for pain          =================================================================    HPI  Kaci Gavin is a 41 year old female who presents for evaluation of finger injury.    Patient reports she slammed her 3rd finger of the right hand in a car door, with it stuck of a few seconds. She had immediate onset of pain to the distal tip and has an imprint in her finger from where she got it jammed. No open wounds present. Patient did not take pain medications prior to arrival.       I, Marcela Méndez am serving as a scribe to document services personally performed by Dr. Ekaterina Frankel DO, based on my observation and the provider's statements to me. I, Dr. Ekaterina Frankel DO attest that Marcela Méndez is acting in a scribe capacity, has observed my performance of the services and has documented them in accordance with my direction.      REVIEW OF SYSTEMS   Musculoskeletal: Endorses right third finger pain (distal tip)  Skin: Denies wound    Remainder of systems reviewed, unless noted in HPI all others negative.      PAST  MEDICAL HISTORY:  No past medical history on file.    PAST SURGICAL HISTORY:  Past Surgical History:   Procedure Laterality Date     ENDOMETRIAL ABLATION       REVISION FRANKIE-EN-Y  02/05/2007     TUBAL LIGATION             CURRENT MEDICATIONS:    Prior to Admission medications    Medication Sig Start Date End Date Taking? Authorizing Provider   betamethasone dipropionate (DIPROSONE) 0.05 % lotion Apply topically 2 times daily Do not fill, patient will call when needed. 2/5/15   Elif Salmon MD   ergocalciferol (ERGOCALCIFEROL) 04246 UNITS capsule Take 1 capsule (50,000 Units) by mouth once a week 9/23/14   Regina Magallanes MD   Ferrous Sulfate 324 (65 FE) MG TBEC Take 1 tablet by mouth 3 times daily 10/15/15   Madison Mckenna MD   fluocinolone (DERMA-SMOOTHE/FS SCALP) 0.01 % external oil Apply 100 mLs topically daily 9/4/14   Madison Mckenna MD   ibuprofen (ADVIL,MOTRIN) 800 MG tablet Take 800 mg by mouth every 8 hours as needed    Reported, Patient   ketoconazole (NIZORAL) 2 % cream Mix with betamethasone and apply to scalp once daily. 2/5/15   Elif Salmon MD   ketoconazole (NIZORAL) 2 % shampoo Use to shampoo once weekly. Leave on scalp 5 minutes prior to rinsing. 9/4/14   Madison Mckenna MD   Multiple Vitamin (DAILY MULTIVITAMIN PO) Take by mouth daily    Reported, Patient   Omega-3 Fatty Acids (FISH OIL) 306 MG CAPS Take by mouth daily    Reported, Patient   vitamin A 55855 UNITS TABS Take 1 tablet by mouth daily    Reported, Patient         ALLERGIES:  No Known Allergies    FAMILY HISTORY:  Family History   Problem Relation Age of Onset     Cancer No family hx of         skin cancer     Obesity Sister      Hypertension Sister      Obesity Sister      Obesity Sister        SOCIAL HISTORY:   Social History     Socioeconomic History     Marital status: Single     Spouse name: Not on file     Number of children: Not on file     Years of education:  "Not on file     Highest education level: Not on file   Occupational History     Not on file   Tobacco Use     Smoking status: Never Smoker     Smokeless tobacco: Never Used   Substance and Sexual Activity     Alcohol use: Not Currently     Comment: Alcoholic Drinks/day: once per month     Drug use: Not on file     Sexual activity: Not on file   Other Topics Concern     Parent/sibling w/ CABG, MI or angioplasty before 65F 55M? Not Asked   Social History Narrative     Not on file     Social Determinants of Health     Financial Resource Strain:      Difficulty of Paying Living Expenses:    Food Insecurity:      Worried About Running Out of Food in the Last Year:      Ran Out of Food in the Last Year:    Transportation Needs:      Lack of Transportation (Medical):      Lack of Transportation (Non-Medical):    Physical Activity:      Days of Exercise per Week:      Minutes of Exercise per Session:    Stress:      Feeling of Stress :    Social Connections:      Frequency of Communication with Friends and Family:      Frequency of Social Gatherings with Friends and Family:      Attends Jain Services:      Active Member of Clubs or Organizations:      Attends Club or Organization Meetings:      Marital Status:    Intimate Partner Violence:      Fear of Current or Ex-Partner:      Emotionally Abused:      Physically Abused:      Sexually Abused:        PHYSICAL EXAM    /59   Pulse 73   Temp 99.5  F (37.5  C) (Oral)   Resp 16   Ht 1.651 m (5' 5\")   Wt 74.8 kg (165 lb)   LMP 08/27/2021   SpO2 100%   Breastfeeding No   BMI 27.46 kg/m    General presentation: Alert, Vital signs reviewed. No apparent distress.   HENT: ENT inspection is normal. Oropharynx is moist and clear.   Eye: Pupils are equal and reactive to light. EOMI  Neck: The neck is supple.  Pulmonary: Currently in no acute respiratory distress.   Circulatory: Peripheral pulses are strong and equal in the bilateral upper lower extremities. "   Neurologic: Alert, oriented to person, place, and time. No gross motor or sensory deficits noted in the upper or lower extremities. Cranial nerves II through XII are grossly intact.  Musculoskeletal: No extremity tenderness. Full range of motion in all extremities. No extremity edema. Mild to moderate tenderness to palpation over distal tip of right 3rd finger. No swelling or ecchymosis. No open wounds. Nail intact with normal sensation.   Skin: Skin color is normal. No rash. Warm. Dry to touch.          LAB:  All pertinent labs reviewed and interpreted.  Labs Ordered and Resulted from Time of ED Arrival Up to the Time of Departure from the ED - No data to display    RADIOLOGY:  Reviewed all pertinent imaging. Please see official radiology reports  XR Finger Right G/E 2 Views   Final Result   IMPRESSION: Acute, comminuted, nondisplaced fracture through the tuft of the distal phalanx of the middle finger.                 I, Marcela Méndez, am serving as a scribe to document services personally performed by Dr. Ekaterina Frankel based on my observation and the provider's statements to me. IEkaterina, DO attest that Marcela Méndez is acting in a scribe capacity, has observed my performance of the services and has documented them in accordance with my direction.    Ekaterina Frankel D.O.  Emergency Medicine  [facility] EMERGENCY ROOM  [address] [id number] [phone]  Dept: [phone]         Ekaterina Frankel DO  09/05/21 2222

## 2023-03-21 ENCOUNTER — LAB (OUTPATIENT)
Dept: LAB | Facility: CLINIC | Age: 43
End: 2023-03-21
Payer: COMMERCIAL

## 2023-03-21 ENCOUNTER — OFFICE VISIT (OUTPATIENT)
Dept: SURGERY | Facility: CLINIC | Age: 43
End: 2023-03-21
Payer: COMMERCIAL

## 2023-03-21 VITALS
HEIGHT: 65 IN | SYSTOLIC BLOOD PRESSURE: 118 MMHG | WEIGHT: 186 LBS | DIASTOLIC BLOOD PRESSURE: 68 MMHG | BODY MASS INDEX: 30.99 KG/M2

## 2023-03-21 DIAGNOSIS — K91.2 POSTOPERATIVE MALABSORPTION: Primary | ICD-10-CM

## 2023-03-21 DIAGNOSIS — K91.2 POSTOPERATIVE MALABSORPTION: ICD-10-CM

## 2023-03-21 DIAGNOSIS — E66.811 CLASS 1 OBESITY WITH BODY MASS INDEX (BMI) OF 30.0 TO 30.9 IN ADULT, UNSPECIFIED OBESITY TYPE, UNSPECIFIED WHETHER SERIOUS COMORBIDITY PRESENT: ICD-10-CM

## 2023-03-21 PROBLEM — E66.01 MORBID OBESITY (H): Status: RESOLVED | Noted: 2023-03-21 | Resolved: 2023-03-21

## 2023-03-21 PROBLEM — E66.01 MORBID OBESITY (H): Status: ACTIVE | Noted: 2023-03-21

## 2023-03-21 LAB
ALBUMIN SERPL BCG-MCNC: 4.3 G/DL (ref 3.5–5.2)
ALP SERPL-CCNC: 85 U/L (ref 35–104)
ALT SERPL W P-5'-P-CCNC: 28 U/L (ref 10–35)
ANION GAP SERPL CALCULATED.3IONS-SCNC: 14 MMOL/L (ref 7–15)
AST SERPL W P-5'-P-CCNC: 41 U/L (ref 10–35)
BILIRUB SERPL-MCNC: 0.3 MG/DL
BUN SERPL-MCNC: 16.8 MG/DL (ref 6–20)
CALCIUM SERPL-MCNC: 9.6 MG/DL (ref 8.6–10)
CHLORIDE SERPL-SCNC: 103 MMOL/L (ref 98–107)
CHOLEST SERPL-MCNC: 168 MG/DL
CREAT SERPL-MCNC: 0.85 MG/DL (ref 0.51–0.95)
DEPRECATED HCO3 PLAS-SCNC: 21 MMOL/L (ref 22–29)
ERYTHROCYTE [DISTWIDTH] IN BLOOD BY AUTOMATED COUNT: 12.7 % (ref 10–15)
FERRITIN SERPL-MCNC: 18 NG/ML (ref 6–175)
FOLATE SERPL-MCNC: 18.3 NG/ML (ref 4.6–34.8)
GFR SERPL CREATININE-BSD FRML MDRD: 88 ML/MIN/1.73M2
GLUCOSE SERPL-MCNC: 80 MG/DL (ref 70–99)
HBA1C MFR BLD: 5.2 % (ref 0–5.6)
HCT VFR BLD AUTO: 40.7 % (ref 35–47)
HDLC SERPL-MCNC: 71 MG/DL
HGB BLD-MCNC: 13.5 G/DL (ref 11.7–15.7)
LDLC SERPL CALC-MCNC: 90 MG/DL
MCH RBC QN AUTO: 28 PG (ref 26.5–33)
MCHC RBC AUTO-ENTMCNC: 33.2 G/DL (ref 31.5–36.5)
MCV RBC AUTO: 84 FL (ref 78–100)
NONHDLC SERPL-MCNC: 97 MG/DL
PLATELET # BLD AUTO: 303 10E3/UL (ref 150–450)
POTASSIUM SERPL-SCNC: 3.8 MMOL/L (ref 3.4–5.3)
PROT SERPL-MCNC: 7.6 G/DL (ref 6.4–8.3)
RBC # BLD AUTO: 4.82 10E6/UL (ref 3.8–5.2)
SODIUM SERPL-SCNC: 138 MMOL/L (ref 136–145)
TRIGL SERPL-MCNC: 36 MG/DL
TSH SERPL DL<=0.005 MIU/L-ACNC: 2.51 UIU/ML (ref 0.3–4.2)
WBC # BLD AUTO: 7 10E3/UL (ref 4–11)

## 2023-03-21 PROCEDURE — 99000 SPECIMEN HANDLING OFFICE-LAB: CPT

## 2023-03-21 PROCEDURE — 82306 VITAMIN D 25 HYDROXY: CPT

## 2023-03-21 PROCEDURE — 82728 ASSAY OF FERRITIN: CPT

## 2023-03-21 PROCEDURE — 84425 ASSAY OF VITAMIN B-1: CPT | Mod: 90

## 2023-03-21 PROCEDURE — 36415 COLL VENOUS BLD VENIPUNCTURE: CPT

## 2023-03-21 PROCEDURE — 84590 ASSAY OF VITAMIN A: CPT | Mod: 90

## 2023-03-21 PROCEDURE — 83970 ASSAY OF PARATHORMONE: CPT

## 2023-03-21 PROCEDURE — 84630 ASSAY OF ZINC: CPT | Mod: 90

## 2023-03-21 PROCEDURE — 82607 VITAMIN B-12: CPT

## 2023-03-21 PROCEDURE — 83036 HEMOGLOBIN GLYCOSYLATED A1C: CPT

## 2023-03-21 PROCEDURE — 85027 COMPLETE CBC AUTOMATED: CPT

## 2023-03-21 PROCEDURE — 84443 ASSAY THYROID STIM HORMONE: CPT

## 2023-03-21 PROCEDURE — 80061 LIPID PANEL: CPT

## 2023-03-21 PROCEDURE — 99203 OFFICE O/P NEW LOW 30 MIN: CPT | Performed by: FAMILY MEDICINE

## 2023-03-21 PROCEDURE — 80053 COMPREHEN METABOLIC PANEL: CPT

## 2023-03-21 PROCEDURE — 82746 ASSAY OF FOLIC ACID SERUM: CPT

## 2023-03-21 RX ORDER — SEMAGLUTIDE 0.25 MG/.5ML
0.25 INJECTION, SOLUTION SUBCUTANEOUS WEEKLY
Qty: 2 ML | Refills: 0 | Status: SHIPPED | OUTPATIENT
Start: 2023-03-21 | End: 2024-06-18

## 2023-03-21 RX ORDER — LANOLIN ALCOHOL/MO/W.PET/CERES
2 CREAM (GRAM) TOPICAL
COMMUNITY

## 2023-03-21 RX ORDER — SEMAGLUTIDE 0.5 MG/.5ML
0.5 INJECTION, SOLUTION SUBCUTANEOUS WEEKLY
Qty: 2 ML | Refills: 0 | Status: SHIPPED | OUTPATIENT
Start: 2023-04-21 | End: 2024-06-18

## 2023-03-21 RX ORDER — SENNOSIDES A AND B 8.6 MG/1
1 TABLET, FILM COATED ORAL DAILY
COMMUNITY

## 2023-03-21 RX ORDER — SEMAGLUTIDE 1 MG/.5ML
1 INJECTION, SOLUTION SUBCUTANEOUS WEEKLY
Qty: 2 ML | Refills: 0 | Status: SHIPPED | OUTPATIENT
Start: 2023-05-21 | End: 2024-06-18

## 2023-03-21 RX ORDER — PHENTERMINE HYDROCHLORIDE 37.5 MG/1
18.75-37.5 TABLET ORAL
Qty: 90 TABLET | Refills: 1 | Status: SHIPPED | OUTPATIENT
Start: 2023-03-21 | End: 2023-10-06

## 2023-03-21 NOTE — PATIENT INSTRUCTIONS
"[facility]  [address], [address]  [place] [address]  Phone: [phone]/Fax: [phone]    Thank you for taking the time to come in and consult with [facility]. Bariatric surgery is part of a lifelong journey that needs your care and commitment. Even if a revisional surgery is not advisable at this time, we would still like to provide you with some resources to aid you in properly using your tool and achieving your weight loss goals.     Keys to Success   Eat 3 nutrient-rich meals each day    Don't skip meals--it will cause you to overeat later in the day! Space meals 4-6 hours apart    Eating protein and fiber (vegetables/fruits/whole grains) with meals helps you stay full    Choose foods with less than 10 grams of sugar and 5-10 grams of fat per serving to prevent excess calories and weight gain    Eat around the same times each day to develop a routine eating schedule    Avoid snacking unless physically hungry.   Planned snacks: 1-2 times per day and no more than 150 calories     Eat protein first    Protein helps with healing, maintaining muscle mass and good nutrition, and reducing hunger    For RNY Gastric Bypass, Sleeve Gastrectomy, and Lap Band: aim for 60-80 grams of protein per day    For Duodenal Switch: aim for  grams of protein per day    Eat protein first, then veggies and the fruits or grains     Stop drinking 15-30 minutes before meals and wait 30-45 minutes after eating to drink    Drinking too soon before a meal may cause you to be too full to eat    Drinking too soon after you eat will cause the food to \"wash\" through your new stomach too quickly--leaving you hungry and likely to eat more     Eat S-[id number]-Y    Take 20-30 minutes to eat each meal by taking small bites, chewing foods to applesauce consistency or 20-30 times before you swallow    Not chewing food properly can block the opening of your pouch--causing pain, " "nausea, vomiting    Eating foods too fast can delay those full signals and increase overeating   ? Slow down your eating by smaller plates/bowls, toddler utensils, putting your fork/spoon down between bites and not watching TV/computer during meals!     Make a list of activities to prevent boredom, stress and emotional eating    Go for a walk or lift weights while watching your favorite TV show    Talk to a co-worker or email/call a friend    Keep your hands and mind busy with woodworking, puzzles, knitting or painting your nails    Practice deep breathing or meditation     Drink 64 ounces of 0-Calorie drinks between meals    Water    Zero calorie Propel , Vitamin Water Zero  or SoBe Lifewater , Crystal Light , Sugar-Free Ramírez-Aid , and other sugar-free lemonade or flavored amador    Decaffeinated, unsweetened coffee/ tea (1 cup or less per day)     Avoid Caffeine and Carbonation    Caffeine can irritate your new pouch, increase risk for ulcers, and can increase your appetite    Carbonation can lead to increased bloating/gas and discomfort     Work up to a total of 30-60 minutes of physical activity most days of the week    Helps with losing weight and preventing weight regain after surgery    Do a combination of cardio (walking/water exercises/biking/swimming) and strength training     Take daily vitamin/mineral supplements after surgery    Daily use of vitamins/minerals is necessary for the rest of your life     Follow-up          Studies show that those who follow-up with their health professional regularly maintain their weight loss and those who are \"lost to follow-up\" are at risk for weight regain.          It is essential to monitor vitamin levels with laboratory studies for life following Bariatric surgery.          Routine Fasting Labs for Post-operative Bariatric Patients (ideally drawn at 6, 12, and 18 months post-op and then annually)   Ferritin   Complete Blood Count (CBC)  Vitamin B12   Complete " Metabolic Profile (CMP)  Vitamin D (OH) 25 PTH   Zinc   Thiamine (Vitamin B-1)   Vitamin A   Folate     Eat at home 90% of the time          People who maintain healthy weight eat a meal prepared at home 90% of the time.          Studies show that people consume an average of 770 calories when eating out at a restaurant and 440 calories when eating a meal prepared at home. This equates to almost 35 lbs of excess weight for a person who eats out once a day for one year.     Nausea, vomiting, or feeling of extreme fullness are not normal    Potential Causes:    Not chewing your foods well enough    Eating too fast or eating too much    Dry meat can also cause frothy vomiting or trouble in the pouch.    Cook meats in a crock pot or try boiling or steaming to add moisture    Add chicken broth, water, milk, or small amount of fat free gravy when cooking meats to add moisture    Pound meats with a meat mallet to   inch thick to assist with breaking up fibers found in meat and allowing for a more moist meat   Only dish up on your plate the amount you should be eating, not a teaspoon more   An internal problem that needs to be assessed and addressed by your surgeon.     Lean protein sources     Protein Source  Portion  Calories  Protein      FULL LIQUIDS      *Light yogurt with no lumps or chunks  6 ounces   calories  6-8 grams of protein    *Skim, 1% Milk or no sugar added soy milk  1 cup ( 8 oz)   calories  7-8 grams of protein    Cream soups (strained with no lumps)  1/2 cup  60-80 calories  4 grams of protein    Tomato soup    cup  75 calories  2 grams of protein    Cream Wheat with 1 Tbsp. of protein powder    cup  55calories  7 gram of protein    Sugar-free pudding w/ 1 Tbsp protein powder    cup  55 calories  8 grams of protein    Beneprotein Powder  1   Tbsp  25 calories  6 grams of protein    Whey Protein Powder (varies)  1 scoop   calories  15-20 grams of protein    Nonfat dry milk powder  3  Tbsp.  45 calories  4 grams of protein      PUREE/SOFT (Above liquids plus below items)      1% Cottage Cheese  1/4 cup  50 calories  6 grams of protein    Canned tuna, chicken, crab or salmon   (blended if on purees)  1/4 cup  50-70 calories  8 grams of protein    Fish (broiled, grilled, baked)  2-3 Tbsp.  50-70 calories  7-10 grams of protein    Mashed pot's with 1 Tbsp. of protein powder    cup  50 calories  7 grams of protein    Low-fat refried beans  1/4 cup  55 calories  4 grams of protein    Egg (boiled, poached, or scrambled)  1 Egg  60 calories  6 grams of protein    Boneless, skinless chicken and turkey (broiled, grilled,baked)  1 ounce  70 calories  7 grams of protein    Fish (broiled, grilled, baked)  1 ounce  50-70 calories  7 grams of protein    Tofu  1/4 cup  45-60 calories  47 grams of protein    Pureed lean chicken, turkey, pork, or beef  2 Tbsp.  50-60 calories  7 grams of protein    Smooth peanut butter, natural  1 Tbsp.  90 calories  4 grams of protein      GENERAL TEXTURE FOODS (Above foods and liquids plus below items)   (Avoid foods with skins, seed, peels, and membranes for the first 3 months after surgery)      Skinless, boneless chicken and turkey breasts  3 Tbsp.  35-40 calories  7-10 grams of protein    Lean cuts of red meat and pork (sirloin, round, tenderloin, flank, 93%-96%)  3 Tbsp.  50-65 calories  7-10 grams of protein    Soy Burger (varies)  3 Tbsp.  35-45 calories  6 grams of protein    Garbanzo, black, gallardo beans  1/4 cup  55 calories  4 grams of protein    Luncheon Meats (turkey, lean ham, roast beef)    cup  50 calories  6 grams of protein    String Cheese  1  80 calories  8 grams of protein    Kidney and lima beans    cup  55 calories  4 grams of protein      Chili (beans and extra lean beef or turkey)    cup  40-50 calories  7-10 grams of protein    Low-fat casserole with lean meat    cup  60 calories  7 grams of protein    Vegan crumbles    cup  35 calories  6 grams of  "protein    [place], tuna and swordfish steak (non-fried)    cup  60-70 calories  7-10 grams of protein    Cod, halibut, walleye (non-fried)    cup  35-50 calories  7-10 grams of protein         Liquid Dairy: (Milk and Yogurt): These food items contain LACTOSE (a milk sugar and carbohydrate). Be careful to count these foods as a source of protein as well as a source of carbohydrate.    Lactose Intolerance: Many patients find that they have difficulty digesting milk and liquid dairy products before surgery. If you experience gastrointestinal symptoms (gas, bloating, cramping, diarrhea, etc.) after eating these foods continue to avoid them after surgery.   It can also be common for patients do develop LACTOSE intolerance after surgery due the \"sugar\" content of milk.   If you experience discomfort (after surgery) after eating liquid dairy, continue to avoid these foods and ask your dietitian for alternative dairy and calcium sources.     Portion Size Advancement      0-3 Months 3-6 Months  6-9 Months 9-12 & Beyond Beyond    RNY Gastric Bypass 1/4 cup per meal 1/2 cup per meal 1/2-3/24 cup per meal 3/4-1 cup per meal 1 cup per meal   Duodenal Switch 1/4 -1/2 cup per meal 1/2-3/4 cup per meal 3/4 - 1 cup per meal 1 cup 1 cup per meal   Gastric Sleeve 1/4- 1/2 cup per meal 1/2-3/4 cup per meal 3/4- 1 cup per meal 1 cup 1 cup per meal   Lap Band  1/2 cup per maximum per meal 1/2 cup to 1 cup maximum per meal 1/2 cup to 1 cup maximum per meal  1 cup per meal 1 cup per meal     Tips to Eat More Fiber     Why is fiber important?   Fiber: is found in fruits, vegetables, whole grains, legumes (dried beans and peas), nuts and seeds.     What can fiber do to help me?    Reduce high blood cholesterol    Reduce high blood pressure    Manage body weight    Prevent constipation and relieve hemorrhoids    Improve blood sugar control    Reduce colon cancer risk     How can I increase the fiber in my diet?    Eat more fruits and " "vegetables - at least 2 cups of fruit and 2-1/2 cups of vegetables each day.    Choose whole (fresh, frozen or dried) vegetables and fruits over juices, which have most of the fiber removed.    Include legumes (i.e., dried beans and peas) with your meals regularly; increase your intake of these foods gradually to limit the gaseous side effects.    Include nuts and seeds several times a week, which also contain monounsaturated fats and can help control blood cholesterol levels.    Increase the amount of fiber in your diet gradually, using a variety of food sources. Try to include one fiber-rich food in every meal.    Drink plenty of water to enhance fiber's effectiveness and to prevent constipation.     What are some other ways to \"sneak\" fiber into my diet?    Sprinkle flax meal, wheat germ, nuts and/ or seeds onto cold or hot cereal, yogurt, cottage cheese or Greek yogurt.    Serve entrees like steak, chicken or fish on a \"bed\" or grilled zucchini and peppers, sautéed spinach or kale, sautéed onions and mushrooms, or grated carrots and slivered beets.    Try different types of whole grains for variety - like barley, kasha, bulgur, quinoa, wild rice, and couscous. Also try other pasta varieties - like whole wheat, brown rice or quinoa pastas.    Choose whole-grain breads, pastas, and rice instead of white varieties of the same foods    Select high-fiber breakfast cereals--read the Nutrition Facts label to find cereals that have at least 5 g dietary fiber/serving    Try oatmeal for breakfast topped with Greek yogurt and fruit          High Fiber Recipes   Vegetarian Chili   1 medium onion 2 garlic cloves, minced   2 tsp. Vegetable oil 1 (15 oz) can black beans   1 (15 oz) can Chili Beans 1 Tsp. Chile Powder   1 (15 oz) can low salt tomato sauce   2 small cans of diced tomatoes with chilies (low-sodium if available)   1 (12 oz) package of Boca, Morning Star or other brand of vegan crumbles   Directions: Sauté onion, " garlic, and canola in a saucepan over medium-high heat for 2 minutes. Stir in vegan crumbles and cook for 2 minutes while alternating stirring. Add the rest of ingredients and cook over medium to high heat. Once the mixture reaches a boil, turn the heat down to simmer and let simmer for 30 minutes.   Nutritional Information:   Servings Size: 1 Cup Yield: 8 Servings   Calories: 170 calories Protein: 18 grams Fat: 3 grams Dietary Fiber: 8 grams     Hummus     cup water 1 Tbslp extra virgin olive oil     cup tahini 6 Tbsp. of fresh lemon juice   6 garlic cloves, peeled   teaspoon salt   2 (15 oz) can of chickpeas 1 teaspoon ground cumin   Directions: Place the water, tahini, and garlic in a  and blend until a pureed mixture is formed. Once formed, place the rest of the ingredients in the mixed and blend until it is a pureed mixture again. Add pepper to taste if desired. You may also add flavorless protein powder to boost the protein. Serve with vegetables, whole grain toast, or whole grain crackers.   Nutrition Information:   Serving Size:   cup Yield: 4 cups   Calories: 102 calories Protein: 4 grams Fat: 4.2 grams Dietary Fiber: 4 grams     Carbohydrates     Carbohydrates fuel your body with glucose (sugar)--the energy your body needs so you can do your daily activities. Carbohydrates offer an immediate source of energy for your body. They provide the fuel for your muscles and organs, such as your brain.     Types of Carbohydrates   1. Complex Carbohydrates are higher in fiber and keep you feeling full longer--helping you eat less. These are found in nearly all plant-based foods and usually take longer for the body to digest.   They are most commonly found in whole-wheat bread, whole-grain pasta, brown rice, starchy vegetables,   and fruits   2. Refined Carbohydrates require almost NO WORK for digestion and break down into glucose more quickly   than complex carbohydrates. Refined carbohydrates are usually high  in calories and low in nutrients and fiber--   eating more of these can lead to weight gain.   How much carbohydrate should you consume?   If the foods you eat contain too many carbohydrates, the following occurs:    Carbohydrates are stored in the liver and muscle cells, and are used when the body needs an extra burst of energy    Anything leftover that is not stored in the liver and muscle cells is turned into fat     If you do not eat enough carbohydrates, the following can occur:    Fatigue    Muscle cramps    Poor mental function     Fatigue easily results from deprivation of carbohydrates, which is seen in people who fast, possibly interfering with activities of daily living.     Carbohydrates are your body's first choice for fuel. If given a choice of several types of foods simultaneously, your body will use the energy from carbohydrates first.     What foods contain carbohydrates?     Choose the following foods containing carbohydrates (the BEST ones to eat):    Fruit: fresh, frozen, canned in their own juices    Whole grains:   Whole-wheat breads   Brown rice   Oatmeal   Whole-grain cereals   Other starchy foods containing a minimum of 3 grams (g) fiber/100 calories   The ingredient label should list whole wheat or whole grain as one of the first ingredients (bulgur, quinoa, buckwheat, millet, spelt, faro, kasha)      Milk or yogurt (a natural source of carbohydrates):   Low-fat milk   Fat-free milk   Yogurt   Beans or legumes   Starchy vegetables, raw or frozen:  Potatoes   Peas   Corn       AVOID or limit the following foods containing carbohydrates:    Refined sugars, such as in: Candy, Desserts-ice cream, cakes, pies, brownies, frozen yogurt, sherbet/sorbet, cookies   White flour: bread/pasta/crackers/rice/tortillas    Sugary snacks: sweetened cereal, granola bars, cereal bars, donuts, muffins, bagels   Sugary Drinks: Fruit Juice, smoothies, sports drinks, regular soda      What are typical serving  "sizes or portions?     The following are some serving and portion sizes for foods containing carbohydrates:      One medium piece of fruit, about 4?5 ounces (oz) (-tennis ball)    1 cup (C) berries or melon      C canned fruit      C juice (100% vegetable)      C starchy vegetables, cooked or chopped    One slice whole-grain bread    ? C brown rice, quinoa, buckwheat, millet, spelt, faro, kasha           C oatmeal (dry)           C bulgur        One small tortilla (less than 6inch diameter)          C wheat germ       1 oz pretzels         C flaked cereal   Carbs can fit into your balanced meal- 3x per day!     Reading Labels     Reading food labels can help you consume a diet low in fat and sugar. Foods such as chips, cookies, cakes/pies, fried foods, etc are high in fat, sugar, and calories and eaten on a regular basis will slow your weight loss and may cause weight regain.     Choose foods with less than three to five grams of fat per serving size   Choose foods with less than five to 10 grams of sugar per serving size   If you see words such as sugar, syrup, or any word ending in \"ose\" or \"ol\" in the first three ingredients it is recommended to avoid the product as it will most likely be high in sugar     Portion Sizes   The size of your whole thumb: = 1 tablespoon   The size of a fingertip: = 1 teaspoon   A deck of cards: = 3 ounces (cooked fish, poultry, meat)  A light bulb: =   cup of food   A tennis ball: = 1 cup of food   A ping-pong ball: = 2 tablespoons of food     Healthy Eating Tips with Food Preparation   Low fat Cooking Methods    [employer], bake, or broil meats vs. adding fats and frying    Steam vegetables and rice    Microwave    Lightly stir-langford or sauté in cooking spray, small amount of oil, or reduced sodium broth     Use the following flavorings instead of butters, oils, and creams    Fat-free mayonnaise or light mayonnaise    Fat-free or reduced fat salad dressing    Fat-free or reduced fat sour " cream    Herbs such as basil, cilantro, parsley, carmelo, or rosemary    Spices such as nutmeg, pepper, paprika, or cinnamon    Mrs. Dash?    Lemon or lime juice    Salsa    Spray margarine or fat free non-stick spray    Fresh ginger or red pepper flakes    Mustard or ketchup    Powders, garlic, onion, celery     Food substitutions:   Choose:  Versus:     Part skim ricotta cheese Regular ricotta cheese   1 % skim or buttermilk Whole milk   Legumes Meat   Chicken, beef or vegetable broth Fats when sauteing foods   Spray margarines or butter flakes on vegetables on bread Regular butter or margarine    Low calorie cooking sprays Fats when frying foods   Part skim mozzarella cheese Hard cheeses such as cheddar   Applesauce in moist cake recipes Oil in moist cake recipes   Turkey and chicken breast, lean ham and lean roast beef Bologna, salami, sausage and flores   Whole grain or whole wheat breads, rice, pastas, and cereals White or wheat selections   1/4 cup egg substitute or 2 egg whites Whole eggs   No added salt canned vegetables Regular canned vegetables   Canned fruit in light syrup or its own juice Heavy syrup canned fruit     Dining Out Healthfully   Be your own advocate!      Ask questions and let them know what you want:   - Ask how foods are prepared.   - Please leave my salad dressing, butter, gravy, or sauces on the side   - What type of oil do you use in preparation?   - Do you have any lower fat/healthy dishes?    Order salad dressings, butter, gravy, or sauces on the side    Try lemon juice or vinegar and oil in place of salad dressing or ask for lite salad dressing    Substitute fruit, vegetables, or a side salad for French fries or potato chips    Avoid mayonnaise and honey mustard on sandwiches, instead choose yellow mustard or ketchup    Choose vegetarian or [place] flores and pineapple pizza vs. pepperoni pizza    Ask for substitutions such as skim milk in place of whole milk or steamed vegetables  instead of sautéed vegetables     Learn the Menu Lingo     Less fat       Higher fat    Baked, braised, or boiled      In a cheese or cream sauce    Cooked in its own juices      Batter or deep-fried, crispy, or crunchy    Dry broiled (in wine or lemon juice)     Béarnaise, hollandaise, creamed    Grilled        With gravy, mayonnaise, or cream sauce    Lightly sautéed       Escalloped or au gratin    Poached, roasted, steamed, or stir-fried     Type of Restaurant  Choose  Limit    Fast Food  Grilled chicken sandwich with yellow mustard or ketchup   Grilled chicken salad with light salad dressing   Plain hamburger with ketchup   Turkey deli sandwich, no garcia   Chili  Fried fish   Cheeseburger/[place]   French fries   Onion rings   Meatball sandwich   Steak sub   [place]    Asian Food  Sizzling platter   Stir fried, simmer, steamed, roasted   Chicken and broccoli   Chicken and vegetables   Mixed vegetable medley   Steamed Rice   Ask for entrees to be made with less oil and soy sauce  Egg rolls/Wontons   Sweet and sour entrees   Fried rice   Egg nkechi young with cashews   Ezequiel alan chicken   Sesame chicken   Hoison sauce   Fried entrees      Mexican Food  Grilled chicken or fish fajitas   Tortilla Soup or Black Bean Soup   Beans and rice   T[place]ales   Grilled Fajita salad without the taco shell   Chicken soft taco   Avoid sour cream, guacamole, and cheese sauce  [place]   Flautas/Taquitos   Quesadilla   Nachos   Burrito   Enchilada   Chorizo or [place]   Taco Salad with shell   Refried Beans    Italian Food  Spaghetti ala Marinara   Minestrone   Lightly sautéed, grilled entrees   Grilled, baked, or broiled lean meats Choose tomato, light red or white wine, or clam sauces   Cacciatore  Fettuccini Christian   Italian sausage   Parmigiana   Entrees stuffed with cheese   Manicotti   Creamy sauce   Cannelloni    Seafood  Shrimp with cocktail sauce   Steamed, baked, broiled, grilled fish or other leaner meats such as chicken    Lobster sauce   Salad with light dressing   Steamed or lightly sautéed vegetables  Fried fish   Fish or other meat prepared with large quantities of oil   Drawn butter sauce   Joanna sauce    Vegetarian/Salad Bar  Legumes   Whole grain tortilla with hummus   Lentil or black bean soup   Tofu and vegetable stir langford   Bean curd  Pryor based salads   Fried sides/entrée   Tempura      Sample menus for 18 months and beyond after weight loss surgery       Breakfast Lunch Supplement Dinner     cup. of 1% cottage cheese     cup of diced peaches  1 slice of bread   2-3 slices of lean turkey, ham, or cheese     cup of carrots  8 ounces of skim/ or 1% milk    cup of 93% ground beef mixed with 2 Tbsp. of marinara sauce     cup of green beans     cup of whole grain pasta     Breakfast  Lunch Supplement  Dinner   1/2 cup Egg Beater scrambled     cup of chopped vegetables   2 Tbsp. of light shredded cheese   2 Tbsp. of raspberries 1 cup chili made with lean ground sirloin or lean turkey  8 ounces of skim milk    cup of grilled, broiled, or baked lemon pepper salmon     cup of grilled asparagus     Breakfast Lunch Supplement Dinner   6 ounces of light yogurt,   cup of whole grain cereal, and   cup of mixed berries   cup of low-fat meat loaf     cup of sweet potato     cup of peaches  1 cup of skim milk 9 Tbsp. pork loin made in a crock pot seasoned with a spice rub     cup of steamed broccoli       Breakfast Lunch  Supplement Dinner   1/2 cup of breakfast casserole made with egg substitute and turkey sausage   1/2 of an English Muffin 1 teaspoon of margarine    cup skinless chicken sautéed in 1 teaspoon of olive oil and 3Tbsp. of tomato sauce     cup of pea pods     cup of grapes  1 cup light yogurt    cup of teriyaki turkey     cup of grilled peppers      Breakfast Lunch Supplement Dinner   1/2 cup of breakfast casserole made with egg substitute and turkey sausage   1/2 of an English Muffin 1 teaspoon of margarine    cup skinless  chicken sautéed in 1 teaspoon of olive oil and 3Tbsp. of tomato sauce     cup of pea pods     cup of grapes  1 cup light yogurt    cup of teriyaki turkey     cup of grilled peppers      Breakfast Lunch Supplement  Dinner     cup of whole grain oatmeal     cup of blueberries    cup shrimp cocktail with low-sugar cocktail sauce for dipping   3 Tbsp. of carrots  1 cup of skim/1% milk    cup of sloppy juan manuel made with lean ground beef or turkey     of a whole grain dinner roll     cup of mixed vegetables      Breakfast Lunch Supplement  Dinner     English Muffin   1 egg   1 ounces of reduced fat cheese     cup of pears 3/4 cup of black bean soup   4 whole grain crackers  1 string cheese  3/4 cup of tilapia with lemon pepper seasonings     cup of stewed tomatoes      Breakfast Lunch Supplement  Dinner     cup of turkey sausage patties     cup of fresh fruit    cup of low-fat tuna salad   5 whole grain crackers or 1 slice of whole wheat bread     cup of pea pods  8 ounces of skim milk    cup of sirloin steak   1/4 cup of fruit salad     cup of brown rice      Meal Planning 101   A Balanced Diet   It is important to choose foods from all of the food groups to maintain good health. Eating the right types of foods is as important as eating the right portion size. The first six months it is recommended to focus 75 percent of your meals on protein. This does not last forever though. Once you get past six months you will have more capacity to add more fruits, vegetables, and whole grains to promote a more balanced diet. By one year out of surgery most people are able to consume   cup of lean protein,   cup of vegetables, and   cup of fruit or whole grains per meal.     Protein Group   Lean sources of red meat such as sirloin, extra lean hamburger, and flank cuts of steak in addition to skinless poultry, pork loin/tenderloin, fish, eggs, cottage cheese, beans, and meat substitutes are a part of this group. Foods in this group  provide various nutrients and are very important in preserving lean muscle mass during times of rapid weight loss.   Avoid adding fats such as gravy, butter or margarine, cheese, and cream sauces to meats and avoid frying meats as it increases the fat and calories, which will inhibit the overall success of your weight loss.     Milk Group   Skim/1% milk, no sugar added soymilks, lactose-free milk, nutritional supplements, and light/low fat yogurts are part of this group.   Avoid flavored soymilks and milks as well as regular yogurts as they will most likely be too high in sugar and calories and will negatively affect your weight loss.     Vegetable Group   Any fresh, frozen, cooked or canned vegetable falls in this category.   They are very low in calories, fat, and sodium. Avoid adding butter, oil, cheese, and cream sauce to vegetables as it will increase calories and fat and deter the success of your weight loss.   In the beginning avoid vegetables with skins and peels as they may cause a blockage at the pouch outlet and cause increased gas/bloating.The further you get out of surgery the more you will be able to tolerate and increase overall vegetable consumption.     Fruit Group   Any fresh, frozen, cooked, or canned fruit falls into this category. When consuming canned fruit choose canned fruit in own juice or in light syrup. Avoid adding sugar, fruit dip, and cream to fruits.   Limit diluted fruit juice to less than four ounces per day once on soft or solid food textures as juice contains a high amount of sugar and calories. Avoid fruits with skins, peels, seed, and membranes such as grapes, raspberries, and oranges during the first three to six months as they can cause the pouch outlet to become clogged.     Starch Group   Fortified bread, rice, pasta, and cereals as well as starchy vegetables such as potatoes, corn, and squash all fall into the starch group. These foods are also known as complex  "carbohydrates.   Simple carbohydrates such as candy, cookies, sugar, and juice should be avoided. Simple carbohydrates are rapidly broken down to sugar and causes your insulin level to rise rapidly to accommodate the sugar, which in returns causes your sugar levels to drop dramatically. This can make you hungrier sooner and cause you to crave sugar-containing foods.     Fat Group   Fats are important to reserve and store energy. However, too much fat can interfere with successful weight loss as fat contributes the greatest amount of calories.   Meats, cheese, nuts/seed, peanut butter, cheese, and milk products naturally contain fat. By one year out of surgery you should consume between 30 to 40 grams of fat per day. The healthy added fats to choose in moderation include olive oil, canola oil, and soft tub margarines.   Remember that \"fat free\" foods do not mean calorie free. Calories still count after any weight loss surgery.     Dumping Syndrome     Dumping syndrome is a possible side effect after tex-en-y gastric bypass surgery, but does not occur in all patients. This occurs when the lower end of the small intestine fills too quickly with undigested food from the small intestine that is high in fat or high in sugar. This may occur during or initially after eating a meal and may include nausea, bloating, diarrhea, cold sweats, fast heart rate, and/or lightheadedness. Due to this it is recommended to consume foods and liquids that are sugar free or low in sugar   ? Read food labels and avoid foods with more than 5-10 grams of sugar per serving and   keep meals to 10 grams or fat or less   ? Foods labeled sugar free indicate there is less than 5 grams of sugar per serving.   Foods labeled low fat consist of 3 grams of fat or less per serving   ? Sorbitol, Mannitol, and Xylitol are all sugar alcohols that may cause increased bloating,   cramping, and gas AVOID THESE and keep to 10g or less per serving   ? Read food " labels and avoid foods with sugar as one of the first 3-5 ingredients such as:   sugar, honey, dextrose, corn syrup, fructose, molasses, and sucrose   ? Read food labels and avoid foods with the first 2 ingredients as hydrogenation or   partially hydrogenation     *Foods containing high concentration of sugar that should be avoided*  Beverages Desserts   Ramírez-Aid  Cake   Boost/Ensure Candy/Candy bars   Chocolate milk/Flavored soymilk Cookies/Pies   Hot cocoa  Fruit Juice/Regular soda   Frozen and Fruit flavored yogurt Ice cream/Ice cream bars   Condiments   Jell-O/Pudding    BBQ sauce Juice bars   Jelly/Jam  Pies   Honey Popsicle/Fudgesicle    Syrup    Sugar/Brown sugar   Spaghetti sauce    Cereals/Breakfast Items Foods containing a high concentration of fat   Cereal bars 2% or whole milk    Donuts Eggnog   Granola Gravy   Sweetened cereals Fried foods   Muffins Mayonnaise   Pastries Salad dressings   Pop Tarts Sour cream   Cream cheese   Hollandaise/Béarnaise sauces     ? A diet with a regular consumption of these foods will lead to decreased weight loss   and possible dumping syndrome       Fat free/Low fat versions and sugar free versions of the above are okay for moderate use.     Physical Activity   Benefits of Exercise    Being physically active is essential for weight loss and long-term weight maintenance- helps to promote a leaner body    Assists with burning off calories and maintains lean body mass during weight loss    Assists with improved cardiac function    Helps to lower blood sugar levels if uncontrolled    May help to improve mood and emotion as well as build confidence     Exercise Recommendations:    The surgeon general's report recommends that all Americans should accumulate 30 minutes of moderate-intensity physical activity 4-5 times per week, if not all days of the week.    Exercise can be done in one bout of 30 minutes or broken into 10 minute segments 3 times per day (Example: 10 minute walk  in the morning, noon, and in the evening)    Incorporate more lifestyle activity into the day      If you have ever been cautioned on exercise, been told you have cardiac problems, or recently been diagnosed with high blood pressure. Please check with your doctor before starting an exercise program.     Structured Physical Activity     Examples of light exercise: Moderate exercise: Vigorous exercise:   Sitting Brisk walking (3-4 mph) Brisk walking (4 mph)   Walking slowly Cycling (<10 mph) Cycling (10 mph)   Slow bicycling  Mowing lawn (push) Scrubbing floors   Stretching exercise Golf (no cart) Digging in garden   Yoga Gardening  Swimming laps   Sweeping Canoeing  Stair climbing       Examples of Lifestyle Activity:   Take the stairs versus the elevator   Get off one block earlier from your usual stop and walk the rest to work   Park farther away and walk the rest for errands   Participate in household chores or yard work   Walk to your neighbors to ask a question instead of calling       Taking Your Vitamins: Easy as 1-1-2-2     1st 3 Months after Surgery   Choose chewable, liquid or crushable forms   of Multivitamin and Calcium Citrate         1.) Sublingual Vitamin B12: Take 9133-8234 mcg 1 time daily     2.) Multivitamin with Iron: Take 1 multivitamin 2 times daily    Needs 18 mg of IRON per dose   o Mike Doo , Flintstones  or Generic Complete   o Centrum  Chewable   o Centrum, Women's One-A-Day or Generic (after 3 months)    NO GUMMY Vitamins (these do not contain iron)     3.) Calcium Citrate with Vitamin D: Take 400-600 mg 2 times daily    Bariatric Advantage: Citrate Lozenges (500mg)--2 Daily, or Chewy Bites (250 mg)--4 Daily    [email or web address].netTALK or 1-410.720.6254      Celebrate Calcium: Calcium Plus 500 (500mg)--2 Daily, or Calcet Creamy Bites (500 mg)--2 Daily,   or Calcium Citrate Chews (250mg) - 4 Daily      [email or web address] or 1-379.666.1830     UNJURY Opurity: Calcium Citrate Plus  (300mg)--3 Daily   www.[employer] or 1-136.521.9580   www.[employer] or 1-637.915.1030--3 Scoops Daily   SimuForm       Up-Dada D: Nutrition Direct: Flavorless Calcium Powder (500 mg with 250 IU Vitamin D)   [place] Liquid Calcium Citrate--1 Tbsp. (500 mg)--2 Times Daily   After 3 months post op: Citracal Petites (or Generic version) (200mg) - 4 daily       4.) Vitamin D3 : Take 5000 IU Daily     Why take vitamins after Bariatric surgery for life?     Vitamin B-12 (once a day if sublingual, once or twice monthly if by injection) is particularly important for our neurologic (brain and spinal cord) and hematologic (blood) health.   Vitamin B-12 deficiency is associated with neurologic, hematologic, and psychiatric problems such as:   Anemia (low blood levels and low oxygen carrying capacity)   Fatigue   Peripheral Neuropathy (Numbness, Tingling, Shooting Pains in the feet and hands)   Memory Impairment   Balance Problems   Irritability, depression, dementia and rarely psychosis.     Sublingual (under the tongue) B-12 is available at stores. Injection B-12 requires a prescription. Either method is acceptable. Patient preference is typically the deciding factor.     Calcium Citrate (two times a day) is important for your bones and teeth     Vitamin D (once a day) helps you to absorb your calcium. This is also important for your bones and teeth.   Inadequate Calcium Citrate and or Vitamin D can lead to:   Bone loss   Osteoporosis   Fracture(s)   Dental problems   Loss of dentition (teeth)     Vitamin D is typically included in Calcium Citrate supplements in a small amount. It is necessary to take a separate Vitamin D supplement. The 5,000IU strength is recommended unless advised otherwise.     Multivitamin plus Iron (twice a day)   Multivitamins contain many important vitamins and minerals. Some of them include:   Iron is an essential component of proteins involved in oxygen transport (Hemoglobin). It is also  essential for the regulation of cell growth and differentiation.   Iron deficiency results in:   Reduced oxygen delivery to cells   Fatigue   Decreased immunity   Hair loss   PICA (cravings for non-food substances such as ice or dirt)   Restless Legs Syndrome     NOTE: it is possible to have excess iron. Monitoring iron levels periodically is indicated.     Vitamin A is important for vision, immune function, and skin and mucous membrane health.   Vitamin A deficiency can lead to:   Dry eyes (xerosis)   Night blindness   Blindness   Impaired Immunity (increased infections)   Skin problems   Respiratory problems, Infertility and Urinary Tract problems     Thiamine (Vitamin B1) is important for the nervous system and the heart.   Thiamine deficiency leads to:   Fluid retention   Heart Failure   Peripheral Neuropathy and Nerve Degeneration   Muscle Wasting   Confusion   Unsteady Gait   Amnesia   Coma     Zinc plays an important role in immunity.   Zinc deficiency can lead to:   Decreased Immunity   Poor wound healing   Hair loss   Nail Changes     Multivitamins also include Vitamins C, E, K, Folic Acid, Niacin, Riboflavin, Copper, and a trace amount of Calcium and Vitamin D.     Medication Concerns After Bariatric Surgery     1. Long acting medications are not the best option anymore     Many types of bariatric surgery involve removing a portion of the small intestine. Long acting or extended release medications release slowly throughout the entire small intestine. Because your surgery has changed your stomach and/or intestine, you may not get the full effect of the long acting medication. Short acting medications may work better for you after surgery. Talk with your doctor at your pre-op history and physical appointment if you are taking long acting medications. Your doctor can change prescriptions for long acting medications to short acting.     2. Avoid NSAIDs for the rest of your life     NSAIDs are Non-Steroidal  Anti-Inflammatory Drugs   The NSAID class of medications is used to relieve minor aches, pains or to treat fever. They are commonly used to treat pain caused by a cold, flu, sore throat, headache, and arthritis. Some NSAIDs are available over-the-counter while others need a prescription from your doctor.     NSAIDs should be avoided after bariatric surgery because they can cause stomach ulcers, scarring or bleeding. You will not be able to take any NSAID mediation for the rest of your life after bariatric surgery. Below is a list of common NSAID medications:     OVER-THE-COUNTER NSAIDs    Ibuprofen - Brand names Advil   , Motrin  , Nuprin     Naproxen - Brand names Aleve  , Naprosyn  , Anaprox      PRESCRIPTION NSAIDs    Celebrex   (Celecoxib) Orudis   (Ketoprofen)   Mobic   (Meloxicam) Lodine   (Etodolac)   Voltaren  (Diclofenac) Ansaid   (Flurbiprofen)   Relafen   Nabumetone Clinoril   (Sulindac)   Feldene   (Piroxicam) Tolectin   Tolmentin   Indocin   (Indomethacin) Daypro  (Oxaprozin)      After surgery, the only safe non-prescription pain reliever is acetaminophen (Tylenol ). Acetaminophen is available in 325mg and 500mg tablets. If acetaminophen does not control your pain, call your primary care provider to discuss other options.    3. Other medications you may have to avoid:     Aspirin     Do not use aspirin for headaches, body aches, or muscle aches after bariatric surgery. This is because aspirin can also cause stomach ulcers. However, your primary care provider may tell you to take aspirin for certain conditions. A maximum of 81mg of enteric coated aspirin (ex: Ecotrin ) once daily is usually okay to take if directed to take aspirin by your primary care doctor. Be sure to take with food or milk.     Alendronate (Fosamax ), risedronate (Actonel ) (risedronate), ibandronate (Boniva ):     These are common osteoporosis medications that can cause erosions or ulcers in the esophagus and stomach. Remind your  "primary care provider that you have had bariatric surgery and discuss other medication options.     Diuretics (\"water pills\")     These medications are used to treat high blood pressure. They can also reduce swelling and water retention caused by various medical conditions. Diuretics should not be used for patients who are having weight loss surgery. Your diuretic will not be restarted immediately after surgery. This is because it is often difficult to drink enough fluids after surgery to keep up with the fluid loss caused by the diuretic/\"water pill\". Call your primary care provider to discuss alternative medications to treat high blood pressure.     4.  Medications for chronic conditions     It is likely that the need for some of your medications will change after weight loss surgery.     High Blood Pressure Medications     As you lose weight, your blood pressure may change. Talk with your primary care provider about how to manage your high blood pressure after surgery. You may need the dose of your blood pressure medication(s) adjusted.   Keep track of your blood pressure, and call your primary doctor if you have low blood pressure symptoms, such as: dizziness, faintness, weakness, light-headedness (especially when going from sitting to standing)     Diabetes Medications     As you lose weight, your blood sugars may change. Talk with your primary care provider about how to monitor and manage your diabetes after surgery. You may need the dose of your diabetes medication(s) adjusted. Call your primary doctor if you have any of these symptoms.   Keep track of your blood sugars, and call your primary doctor if you have low blood sugar symptoms, such as: sweating, shaking, nervousness, headache, light-headedness, dizziness, weakness, or fainting     5. Other information     Medication Absorption     Some medications may not be absorbed as well after bariatric surgery. Watch for changes in symptoms. It is important to " "discuss your medications with your doctor if you think your medications are not working as well as they were before. You may need to have medication doses adjusted.     Non-prescription medications:     You may need medication for seasonal allergies, colds, headaches, or minor aches and pains. It is important to read the package label carefully. Below are some helpful hints for choosing the right medication:   Look at the active ingredient(s) list to be sure the medication does not contain caffeine, NSAIDs or aspirin (maximum aspirin dose: 81 mg daily).   Avoid long-acting medication options. Immediate release medications may work better because they are absorbed better.   It is okay to use liquid medications with the following cautions:     Watch the sugar concentration. High sugar content in medications could cause dumping syndrome. Diluting the liquid medication with an equal amount of water will help prevent dumping syndrome.    Do not use products that contain high fructose corn syrup, corn syrup, sugar, or sorbitol. Look for sugar-free products as an alternative.     - Chewable tablets or tablets that dissolve on your tongue (\"oral-dissolving tablet\") are generally safe to use.     Support Group    Support and accountability is an important part of your weight loss journey and maintenance once you reach your health goals.  Because of this, we require that you attend at least one of our support groups before you meet with the surgeon so you get a feel for what they are like and hopefully establish a connection to others who are going through a similar process or have had surgery before so you can ask your questions.    We currently have two options for support group but they are in the virtual format only at this time.  Both groups are using Microsoft Teams for their platform and you can access it through the web or an tulio that can be downloaded to a smart phone if you have one.      The Pre- and Post-op " Connections Group is on the second Tuesday of the month from 6:30-8pm and is hosted by Chicho Ramires, PhD.  If you are interested in this group, you will need to email him at [email or web address] each month and then he will in turn send you the invitation to join.      We also have a Post-op focused Connections Group the 4th Wednesday of the month from 11am-12pm that is mostly geared toward post-operative patients who are past three months post-op.  This group is hosted by Millie Romeo, SABRA, CBN, CIC and you can email her to join the group at [email or web address] each month and she will send you an invite similar to Chicho's group.  If you decide you would like to be a regular attender at this group, we can add you to an automatic invitation list of people.    You can see more information about available support groups that the [facility] offers to our patients as well by following the link:    [email or web address]      Please let us know if you have any questions about all the information.    Thank you,   [facility]

## 2023-03-21 NOTE — PROGRESS NOTES
Bariatric Follow Up Visit with a History of Previous Bariatric Surgery     Date of visit: 3/21/2023  Physician: Ayala Gandhi MD, MD  Primary Care Provider:  Jeimy Abad   42 year old  female    Date of Surgery: 2/5/2007  Initial Weight: 260#  Initial BMI:   Today's Weight:   Wt Readings from Last 1 Encounters:   03/21/23 84.4 kg (186 lb)     Body mass index is 30.95 kg/m .      Assessment and Plan     Assessment: Kaci is a 42 year old year old female who is 16 yrs s/p  Gilma en Y Gastric Bypass with Dr. Claudia Gavin feels as if she had achieved the goals she hoped to accomplish through bariatric surgery and weight loss.    Encounter Diagnoses   Name Primary?     Postoperative malabsorption Yes     Class 1 obesity with body mass index (BMI) of 30.0 to 30.9 in adult, unspecified obesity type, unspecified whether serious comorbidity present          Current Outpatient Medications:      calcium citrate-vitamin D (CITRACAL) 315-5 MG-MCG TABS per tablet, Take 2 tablets by mouth, Disp: , Rfl:      Cyanocobalamin (VITAMIN B-12) 5000 MCG SUBL, , Disp: , Rfl:      ergocalciferol (ERGOCALCIFEROL) 69111 UNITS capsule, Take 1 capsule (50,000 Units) by mouth once a week, Disp: 8 capsule, Rfl: 0     Ferrous Sulfate 324 (65 FE) MG TBEC, Take 1 tablet by mouth 3 times daily, Disp: 90 tablet, Rfl: 3     fluocinolone (DERMA-SMOOTHE/FS SCALP) 0.01 % external oil, Apply 100 mLs topically daily, Disp: 1 Bottle, Rfl: 6     Multiple Vitamin (DAILY MULTIVITAMIN PO), Take by mouth daily, Disp: , Rfl:      phentermine (ADIPEX-P) 37.5 MG tablet, Take 0.5-1 tablets (18.75-37.5 mg) by mouth every morning (before breakfast) for 180 days, Disp: 90 tablet, Rfl: 1     Semaglutide-Weight Management (WEGOVY) 0.25 MG/0.5ML pen, Inject 0.25 mg Subcutaneous once a week for 28 days, Disp: 2 mL, Rfl: 0     [START ON 4/21/2023] Semaglutide-Weight Management (WEGOVY) 0.5 MG/0.5ML pen, Inject 0.5 mg Subcutaneous  once a week for 28 days, Disp: 2 mL, Rfl: 0     [START ON 5/21/2023] Semaglutide-Weight Management (WEGOVY) 1 MG/0.5ML pen, Inject 1 mg Subcutaneous once a week for 28 days, Disp: 2 mL, Rfl: 0     senna (SENOKOT) 8.6 MG tablet, Take 1 tablet by mouth daily, Disp: , Rfl:      vitamin A 06535 UNITS TABS, Take 1 tablet by mouth daily, Disp: , Rfl:      Vitamin D3 (CHOLECALCIFEROL) 125 MCG (5000 UT) tablet, Take by mouth daily, Disp: , Rfl:      Plan: Phentermine  18.75mg, Wegovy. Dietitian    Return in about 3 months (around 6/21/2023).    Bariatric Surgery Review     Interim History/LifeChanges: Initial weight 260# lost to 155# Happiest at 165#. Recently got back on track. Taking vitamins with consistency. Working out now 3X/wk    Patient Concerns: weight regain. f/u  Appetite (1-10): stable  GERD: no    Medication changes: none    Vitamin Intake:   B-12   SL   MVI  1/d with iron   Vitamin D  5,000   Calcium   has not taking     Other  mg              LABS: ordered    Nausea no  Vomiting no  Constipation yes  Diarrhea no  Rashes no  Hair Loss yes  Calf tenderness no  Breathing difficulty no  Reactive Hypoglycemia yes avoids  Light Headedness no   Moods euthymic    12 point ROS as above and otherwise negative      Habits:  Alcohol: no  Tobacco: no  Caffeine [place]  NSAIDS no  Exercise Routine: recently 3X/wk gym lifting mostly but cardio as well.  3 meals/day yes  Protein first yes  60grams/day  Water Separate from meals yes  Calorie Containing Beverages no  Restaurant eating/wk 0  Sleeping 7  Stress high d/t wedding planning  CPAP: AN  Contraception: ablation  DEXA:not indicated d/t age <45    Social History     Social History     Socioeconomic History     Marital status: Single     Spouse name: Not on file     Number of children: Not on file     Years of education: Not on file     Highest education level: Not on file   Occupational History     Not on file   Tobacco Use     Smoking status: Never     Smokeless tobacco:  Never   Substance and Sexual Activity     Alcohol use: Not Currently     Comment: Alcoholic Drinks/day: once per month     Drug use: Not Currently     Sexual activity: Not on file   Other Topics Concern     Parent/sibling w/ CABG, MI or angioplasty before 65F 55M? Not Asked   Social History Narrative    Single. Getting  in April 2023 after 13 years together. He has  4 children She has 1. Carlos Eduardo is 22 yo and living in [place]-going to school for Criminal Justice.  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      Social Determinants of Health     Financial Resource Strain: Not on file   Food Insecurity: Not on file   Transportation Needs: Not on file   Physical Activity: Not on file   Stress: Not on file   Social Connections: Not on file   Intimate Partner Violence: Not on file   Housing Stability: Not on file       Past Medical History     Past Medical History:   Diagnosis Date     Morbid obesity (H)     Prior to RYGB     Problem List     Patient Active Problem List   Diagnosis     Loss of hair     Dermatitis, seborrheic     Alopecia     Central centrifugal scarring alopecia     Medication monitoring encounter     Vitamin D deficiency     Pain of skin     Medications       Current Outpatient Medications:      calcium citrate-vitamin D (CITRACAL) 315-5 MG-MCG TABS per tablet, Take 2 tablets by mouth, Disp: , Rfl:      Cyanocobalamin (VITAMIN B-12) 5000 MCG SUBL, , Disp: , Rfl:      ergocalciferol (ERGOCALCIFEROL) 60464 UNITS capsule, Take 1 capsule (50,000 Units) by mouth once a week, Disp: 8 capsule, Rfl: 0     Ferrous Sulfate 324 (65 FE) MG TBEC, Take 1 tablet by mouth 3 times daily, Disp: 90 tablet, Rfl: 3     fluocinolone (DERMA-SMOOTHE/FS SCALP) 0.01 % external oil, Apply 100 mLs topically daily, Disp: 1 Bottle, Rfl: 6     Multiple Vitamin (DAILY MULTIVITAMIN PO), Take by mouth daily, Disp: , Rfl:      phentermine (ADIPEX-P) 37.5 MG tablet, Take 0.5-1 tablets  "(18.75-37.5 mg) by mouth every morning (before breakfast) for 180 days, Disp: 90 tablet, Rfl: 1     Semaglutide-Weight Management (WEGOVY) 0.25 MG/0.5ML pen, Inject 0.25 mg Subcutaneous once a week for 28 days, Disp: 2 mL, Rfl: 0     [START ON 4/21/2023] Semaglutide-Weight Management (WEGOVY) 0.5 MG/0.5ML pen, Inject 0.5 mg Subcutaneous once a week for 28 days, Disp: 2 mL, Rfl: 0     [START ON 5/21/2023] Semaglutide-Weight Management (WEGOVY) 1 MG/0.5ML pen, Inject 1 mg Subcutaneous once a week for 28 days, Disp: 2 mL, Rfl: 0     senna (SENOKOT) 8.6 MG tablet, Take 1 tablet by mouth daily, Disp: , Rfl:      vitamin A 54230 UNITS TABS, Take 1 tablet by mouth daily, Disp: , Rfl:      Vitamin D3 (CHOLECALCIFEROL) 125 MCG (5000 UT) tablet, Take by mouth daily, Disp: , Rfl:    Surgical History     Past Surgical History  She has a past surgical history that includes Revision Gilma-En-Y (02/05/2007); Endometrial Ablation; and tubal ligation.    Objective-Exam     Constitutional:  /68   Ht 1.651 m (5' 5\")   Wt 84.4 kg (186 lb)   BMI 30.95 kg/m      General:  Pleasant and in no acute distress   Eyes:  EOMI  ENT:  Airway 1+  Moist mucous membranes  Neck:  Supple, No LAD, No thyromegaly, No carotid bruits appreciated  Respiratory: Normal respiratory effort, no cough, wheezes or crackles  CV:  Regular rate and Rhythm,no murmurs, pulses 2+, no calf tenderness, no LE edema  Gastrointestinal: Abdomen NT/ND, BS+  Musculoskeletal: muscle mass WNL  Skin: color  hair , incisions nicely healed  Neurological: No tremor, normal gait  Psychiatric: alert and oriented X3, mood and affect normal    Counseling     We reviewed the important post op bariatric recommendations:  -eating 3 meals daily  -eating protein first, getting >60gm protein daily  -eating slowly, chewing food well  -avoiding/limiting calorie containing beverages  -drinking water 15-30 minutes before or after meals  -choosing wheat, not white with breads, crackers, " pastas, criselda, bagels, tortillas, rice  -limiting restaurant or cafeteria eating to twice a week or less    We discussed the importance of restorative sleep and stress management in maintaining a healthy weight.  We discussed the National Weight Control Registry healthy weight maintenance strategies and ways to optimize metabolism.  We discussed the importance of physical activity including cardiovascular and strength training in maintaining a healthier weight.    We discussed the importance of life-long vitamin supplementation and life-long  follow-up.    Kaci was reminded that, to avoid marginal ulcers she should avoid tobacco at all, alcohol in excess, caffeine in excess, and NSAIDS (unless indicated for cardioprotection or othewise and opposed by a PPI).    Ayala Gandhi MD, MD, [facility].  3/21/2023  12:15 AM      No images are attached to the encounter.  Total time spent on the date of this encounter doing: chart review, review of test results, patient visit, physical exam, education, counseling, developing plan of care, and documenting = 35 minutes.

## 2023-03-21 NOTE — LETTER
3/21/2023         RE: Kaci Gavin  [address] [id number]        Dear Colleague,    Thank you for referring your patient, Kaci Gavin, to the [facility] [place]. Please see a copy of my visit note below.    Bariatric Follow Up Visit with a History of Previous Bariatric Surgery     Date of visit: 3/21/2023  Physician: Aayla Gandhi MD, MD  Primary Care Provider:  Jeimy Abad  Kaci Gavin   42 year old  female    Date of Surgery: 2/5/2007  Initial Weight: 260#  Initial BMI:   Today's Weight:   Wt Readings from Last 1 Encounters:   03/21/23 84.4 kg (186 lb)     Body mass index is 30.95 kg/m .      Assessment and Plan     Assessment: Kaci is a 42 year old year old female who is 16 yrs s/p  Gilma en Y Gastric Bypass with Dr. Taylor  Kaci Gavin feels as if she had achieved the goals she hoped to accomplish through bariatric surgery and weight loss.    Encounter Diagnoses   Name Primary?     Postoperative malabsorption Yes     Class 1 obesity with body mass index (BMI) of 30.0 to 30.9 in adult, unspecified obesity type, unspecified whether serious comorbidity present          Current Outpatient Medications:      calcium citrate-vitamin D (CITRACAL) 315-5 MG-MCG TABS per tablet, Take 2 tablets by mouth, Disp: , Rfl:      Cyanocobalamin (VITAMIN B-12) 5000 MCG SUBL, , Disp: , Rfl:      ergocalciferol (ERGOCALCIFEROL) 24740 UNITS capsule, Take 1 capsule (50,000 Units) by mouth once a week, Disp: 8 capsule, Rfl: 0     Ferrous Sulfate 324 (65 FE) MG TBEC, Take 1 tablet by mouth 3 times daily, Disp: 90 tablet, Rfl: 3     fluocinolone (DERMA-SMOOTHE/FS SCALP) 0.01 % external oil, Apply 100 mLs topically daily, Disp: 1 Bottle, Rfl: 6     Multiple Vitamin (DAILY MULTIVITAMIN PO), Take by mouth daily, Disp: , Rfl:      phentermine (ADIPEX-P) 37.5 MG tablet, Take 0.5-1 tablets (18.75-37.5 mg) by mouth every morning (before breakfast) for 180  days, Disp: 90 tablet, Rfl: 1     Semaglutide-Weight Management (WEGOVY) 0.25 MG/0.5ML pen, Inject 0.25 mg Subcutaneous once a week for 28 days, Disp: 2 mL, Rfl: 0     [START ON 4/21/2023] Semaglutide-Weight Management (WEGOVY) 0.5 MG/0.5ML pen, Inject 0.5 mg Subcutaneous once a week for 28 days, Disp: 2 mL, Rfl: 0     [START ON 5/21/2023] Semaglutide-Weight Management (WEGOVY) 1 MG/0.5ML pen, Inject 1 mg Subcutaneous once a week for 28 days, Disp: 2 mL, Rfl: 0     senna (SENOKOT) 8.6 MG tablet, Take 1 tablet by mouth daily, Disp: , Rfl:      vitamin A 21315 UNITS TABS, Take 1 tablet by mouth daily, Disp: , Rfl:      Vitamin D3 (CHOLECALCIFEROL) 125 MCG (5000 UT) tablet, Take by mouth daily, Disp: , Rfl:      Plan: Phentermine  18.75mg, Wegovy. Dietitian    Return in about 3 months (around 6/21/2023).    Bariatric Surgery Review     Interim History/LifeChanges: Initial weight 260# lost to 155# Happiest at 165#. Recently got back on track. Taking vitamins with consistency. Working out now 3X/wk    Patient Concerns: weight regain. f/u  Appetite (1-10): stable  GERD: no    Medication changes: none    Vitamin Intake:   B-12   SL   MVI  1/d with iron   Vitamin D  5,000   Calcium   has not taking     Other  mg              LABS: ordered    Nausea no  Vomiting no  Constipation yes  Diarrhea no  Rashes no  Hair Loss yes  Calf tenderness no  Breathing difficulty no  Reactive Hypoglycemia yes avoids  Light Headedness no   Moods euthymic    12 point ROS as above and otherwise negative      Habits:  Alcohol: no  Tobacco: no  Caffeine [place]  NSAIDS no  Exercise Routine: recently 3X/wk gym lifting mostly but cardio as well.  3 meals/day yes  Protein first yes  60grams/day  Water Separate from meals yes  Calorie Containing Beverages no  Restaurant eating/wk 0  Sleeping 7  Stress high d/t wedding planning  CPAP: AN  Contraception: ablation  DEXA:not indicated d/t age <45    Social History     Social History     Socioeconomic  History     Marital status: Single     Spouse name: Not on file     Number of children: Not on file     Years of education: Not on file     Highest education level: Not on file   Occupational History     Not on file   Tobacco Use     Smoking status: Never     Smokeless tobacco: Never   Substance and Sexual Activity     Alcohol use: Not Currently     Comment: Alcoholic Drinks/day: once per month     Drug use: Not Currently     Sexual activity: Not on file   Other Topics Concern     Parent/sibling w/ CABG, MI or angioplasty before 65F 55M? Not Asked   Social History Narrative    Single. Getting  in April 2023 after 13 years together. He has  4 children She has 1. Carlos Eduardo is 24 yo and living in [place]-going to school for Criminal Justice.  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      Social Determinants of Health     Financial Resource Strain: Not on file   Food Insecurity: Not on file   Transportation Needs: Not on file   Physical Activity: Not on file   Stress: Not on file   Social Connections: Not on file   Intimate Partner Violence: Not on file   Housing Stability: Not on file       Past Medical History     Past Medical History:   Diagnosis Date     Morbid obesity (H)     Prior to RYGB     Problem List     Patient Active Problem List   Diagnosis     Loss of hair     Dermatitis, seborrheic     Alopecia     Central centrifugal scarring alopecia     Medication monitoring encounter     Vitamin D deficiency     Pain of skin     Medications       Current Outpatient Medications:      calcium citrate-vitamin D (CITRACAL) 315-5 MG-MCG TABS per tablet, Take 2 tablets by mouth, Disp: , Rfl:      Cyanocobalamin (VITAMIN B-12) 5000 MCG SUBL, , Disp: , Rfl:      ergocalciferol (ERGOCALCIFEROL) 17019 UNITS capsule, Take 1 capsule (50,000 Units) by mouth once a week, Disp: 8 capsule, Rfl: 0     Ferrous Sulfate 324 (65 FE) MG TBEC, Take 1 tablet by mouth 3 times daily, Disp: 90  "tablet, Rfl: 3     fluocinolone (DERMA-SMOOTHE/FS SCALP) 0.01 % external oil, Apply 100 mLs topically daily, Disp: 1 Bottle, Rfl: 6     Multiple Vitamin (DAILY MULTIVITAMIN PO), Take by mouth daily, Disp: , Rfl:      phentermine (ADIPEX-P) 37.5 MG tablet, Take 0.5-1 tablets (18.75-37.5 mg) by mouth every morning (before breakfast) for 180 days, Disp: 90 tablet, Rfl: 1     Semaglutide-Weight Management (WEGOVY) 0.25 MG/0.5ML pen, Inject 0.25 mg Subcutaneous once a week for 28 days, Disp: 2 mL, Rfl: 0     [START ON 4/21/2023] Semaglutide-Weight Management (WEGOVY) 0.5 MG/0.5ML pen, Inject 0.5 mg Subcutaneous once a week for 28 days, Disp: 2 mL, Rfl: 0     [START ON 5/21/2023] Semaglutide-Weight Management (WEGOVY) 1 MG/0.5ML pen, Inject 1 mg Subcutaneous once a week for 28 days, Disp: 2 mL, Rfl: 0     senna (SENOKOT) 8.6 MG tablet, Take 1 tablet by mouth daily, Disp: , Rfl:      vitamin A 11096 UNITS TABS, Take 1 tablet by mouth daily, Disp: , Rfl:      Vitamin D3 (CHOLECALCIFEROL) 125 MCG (5000 UT) tablet, Take by mouth daily, Disp: , Rfl:    Surgical History     Past Surgical History  She has a past surgical history that includes Revision Gilma-En-Y (02/05/2007); Endometrial Ablation; and tubal ligation.    Objective-Exam     Constitutional:  /68   Ht 1.651 m (5' 5\")   Wt 84.4 kg (186 lb)   BMI 30.95 kg/m      General:  Pleasant and in no acute distress   Eyes:  EOMI  ENT:  Airway 1+  Moist mucous membranes  Neck:  Supple, No LAD, No thyromegaly, No carotid bruits appreciated  Respiratory: Normal respiratory effort, no cough, wheezes or crackles  CV:  Regular rate and Rhythm,no murmurs, pulses 2+, no calf tenderness, no LE edema  Gastrointestinal: Abdomen NT/ND, BS+  Musculoskeletal: muscle mass WNL  Skin: color  hair , incisions nicely healed  Neurological: No tremor, normal gait  Psychiatric: alert and oriented X3, mood and affect normal    Counseling     We reviewed the important post op bariatric " recommendations:  -eating 3 meals daily  -eating protein first, getting >60gm protein daily  -eating slowly, chewing food well  -avoiding/limiting calorie containing beverages  -drinking water 15-30 minutes before or after meals  -choosing wheat, not white with breads, crackers, pastas, criselda, bagels, tortillas, rice  -limiting restaurant or cafeteria eating to twice a week or less    We discussed the importance of restorative sleep and stress management in maintaining a healthy weight.  We discussed the National Weight Control Registry healthy weight maintenance strategies and ways to optimize metabolism.  We discussed the importance of physical activity including cardiovascular and strength training in maintaining a healthier weight.    We discussed the importance of life-long vitamin supplementation and life-long  follow-up.    Kaci was reminded that, to avoid marginal ulcers she should avoid tobacco at all, alcohol in excess, caffeine in excess, and NSAIDS (unless indicated for cardioprotection or othewise and opposed by a PPI).    Ayala Gandhi MD, MD, [facility].  3/21/2023  12:15 AM      No images are attached to the encounter.  Total time spent on the date of this encounter doing: chart review, review of test results, patient visit, physical exam, education, counseling, developing plan of care, and documenting = 35 minutes.      Again, thank you for allowing me to participate in the care of your patient.        Sincerely,        Ayala Gandhi MD

## 2023-03-22 LAB
DEPRECATED CALCIDIOL+CALCIFEROL SERPL-MC: 42 UG/L (ref 20–75)
PTH-INTACT SERPL-MCNC: 44 PG/ML (ref 15–65)
VIT B12 SERPL-MCNC: >4000 PG/ML (ref 232–1245)

## 2023-03-23 ENCOUNTER — VIRTUAL VISIT (OUTPATIENT)
Dept: SURGERY | Facility: CLINIC | Age: 43
End: 2023-03-23
Payer: COMMERCIAL

## 2023-03-23 DIAGNOSIS — Z98.84 BARIATRIC SURGERY STATUS: Primary | ICD-10-CM

## 2023-03-23 DIAGNOSIS — E66.9 OBESITY (BMI 30-39.9): ICD-10-CM

## 2023-03-23 LAB
ANNOTATION COMMENT IMP: ABNORMAL
RETINYL PALMITATE SERPL-MCNC: 0.05 MG/L
VIT A SERPL-MCNC: 0.28 MG/L
ZINC SERPL-MCNC: 80.5 UG/DL

## 2023-03-23 PROCEDURE — 97803 MED NUTRITION INDIV SUBSEQ: CPT | Mod: VID

## 2023-03-23 NOTE — PATIENT INSTRUCTIONS
[facility] Bariatric Care  Nutritional Guidelines  Gastric Bypass 18 Months Post Op and Beyond    General Guidelines and Helpful Hints:  Eat 3 meals per day + protein supplement(s). No snacks between meals.  Do not skip meals.  This can cause overeating at the next meal and will prevent adequate protein and nutritional intake.  Aim for 60-80 grams of protein per day.  Always eat your protein first. This assists with optimal nutrition and helps you stay full longer.  Depending on your portion size, you may need to drink approved protein supplement between meals to achieve protein goals. Follow recommendations of your Dietitian.   Eat your protein first, and then follow with fiber.   It is not necessary to count your fiber, but 15-20 grams per day is recommended.    Add fiber by including fruits, vegetables, whole grains, and beans.   Portions should remain about 1 cup per meal. Use measuring cups to be accurate.  Continue to use saucer/salad plates, infant/toddler silverware to keep portion sizes small and take small bites.  Eat S-[id number]-Y to make each meal last 20-30 minutes. Always stop eating when satisfied.  Continue to use caution with foods containing skins, peels or membranes. Chew well!  Aim for 64 oz. of calorie-free fluids daily.  Continue to avoid caffeine and carbonation. If you choose to drink alcohol, do so in moderation.   Remember to avoid drinking during meals, 15-30 minutes before and 30 minutes after.  Exercise is odonnell for continued weight loss and weight maintenance. Aim for 30-60 minutes of physical activity most days of the week. Include cardiovascular and strength training.  If having trouble tolerating meat, try using a crock-pot, tinfoil tent, steamer or other moist cooking method to create tender meats. Add broth or low-fat gravy to help meat stay moist.   Avoid high sugar and high fat foods to prevent dumping syndrome.  Check nutrition labels for less than 10 grams of sugar and less  than 10 grams of fat per serving.  Continue Taking Vitamins/Minerals:  6848-3869 mcg of Sublingual B-12 daily  1 Multivitamin with Iron twice daily (chewable or swallow tabs)  500-600 mg Calcium Citrate twice daily (chewable or swallow tabs)  5000 IU Vitamin D3 daily    Grocery List  Protein:  Fat free Greek or light yogurt (less than 10 grams sugar)  Fat free or low-fat cottage cheese  String cheese or reduced fat cheese slices  Tuna, salmon, crab, egg, or chicken salad made with light or fat free mayonnaise  Egg or Egg Substitute  Lean/extra lean turkey, beef, bison, venison (ground, sirloin, round, flank)  Pork loin or tenderloin (grilled, baked, broiled)  Fish such as salmon, tuna, trout, tilapia, etc. (grilled, baked, broiled)  Tender cuts of lean (skinless) turkey or chicken  Lean deli meats: turkey, lean ham, chicken, lean roast beef  Beans such as kidney, garbanzo, black, gallardo, or low-fat/fat free refried beans  Peanut butter (natural preferred). Limit to 1 Tbsp. per day.  Low-fat meatloaf (made with lean ground beef or turkey)  Sloppy Joes made with low-sugar ketchup and lean ground beef or turkey  Soy or vegetable protein (i.e. vegan crumbles, soy/veggie burger, tofu)  Hummus    Vegetables:  Fresh: cooked or raw (as tolerated)  Frozen vegetables  Canned vegetables (low sodium or no salt added, rinse before cooking/eating)  (Ok to have skins/peels/membranes/seeds - just chew well)    Fruits:  Fresh fruit  Frozen fruit (no sugar added)  Canned fruit (packed in its own juice, NOT syrup)  (Ok to have skins/peels/membranes/seeds - just chew well)    Starch:  Unsweetened whole-grain hot cereal (or high fiber cold cereal, dry)  Toasted whole wheat bread or [place] Thins  Whole grain crackers  Baked /boiled/mashed potato/sweet potato  Cooked whole grain pasta, brown rice, or other cooked whole grains  Starchy vegetables: corn, peas, winter squash        Protein Supplement:   Ready to drink protein shake  with:  15-30 grams protein per serving  Less than 10 grams total carbohydrate per serving   Protein powder mixed with:   Skim or 1% milk  Low fat or fat free Lactaid milk, plain or no sugar added soymilk  Water     Fats: (use in moderation)  1 teaspoon of soft tub margarine  1 teaspoon olive oil, canola oil, or peanut oil  1 tablespoon of low-fat garcia or salad dressing     Sample Menu for 18+ months after Gastric Bypass    You do NOT need to eat/drink the full portion sizes listed below  Always stop when you are satisfied    Breakfast   cup 1% cottage cheese     cup mixed berries   Lunch 2 oz lean roast beef on   [place] Thin with 1 tsp. light garcia    small tomato, chopped, mixed with 1 tsp. light vinaigrette dressing   Supplement Approved protein supplement (if needed between meals)   Dinner 2 oz grilled salmon    cup salad greens with 1 tsp. light salad dressing and 1 tsp. ground flax seed    cup quinoa or brown rice     Breakfast   cup egg substitute with   cup sautéed chopped vegetables  2 light [place] Krisp crackers   Lunch Tuna Melt:   cup tuna mixed with 1 tsp. light garcia over   [place] Thin. Top with 2-3 slices cucumber and 1 oz slice of low fat cheese   Supplement 1 cup skim milk (if needed between meals)   Dinner 3 oz  grilled, broiled, or baked seasoned skinless chicken breast    cup asparagus     Breakfast   cup plain oatmeal made with skim or 1% milk with 1 Tbsp. flavored/unflavored protein powder added  1 mozzarella string cheese   Lunch 2 oz deli turkey breast  1/3 cup salad with 1 tsp. light salad dressing, 1/8 of a whole avocado and 1 Tbsp. sunflower seeds   Dinner 3 oz. pork loin made in a crock pot, seasoned with a spice rub    cup cooked carrots   Supplement Approved protein supplement (if needed between meals)     Breakfast 1 cup breakfast casserole made with egg substitute, turkey sausage,  and steamed, chopped bell peppers   Supplement  1 cup light Greek yogurt (if needed between meals)   Lunch  2 oz. teriyaki turkey    cup mashed sweet potato with 1-2 spritzes of spray butter (like [place])    cup fresh pineapple   Dinner 3 oz low fat meatloaf    cup roasted garlic zucchini     Breakfast   cup leftover breakfast casserole    cup no sugar added applesauce with 1 Tbsp. unflavored protein powder and a sprinkle of cinnamon    Lunch 3 oz shrimp with 1-2 Tbsp. low-sugar cocktail sauce for dipping    c. whole wheat pasta drizzled with   tsp. olive oil   Supplement 1 cup skim/1% milk with scoop of protein powder (if needed between meals)   Dinner Grilled, seasoned kebob with 2 oz lean beef and   cup vegetables     Breakfast Breakfast pizza:   [place] Thin spread with 1 Tbsp. low sugar spaghetti sauce,   cup shredded low fat cheese, melted and 1 slice of [facility] flores     cup fresh fruit mixed with chopped almonds   Lunch   cup black bean soup  4-5 whole grain crackers   Dinner 3 oz  tilapia with lemon pepper seasoning    cup stewed tomatoes   Supplement 1 string cheese (if needed between meals)     Breakfast 2 hard boiled eggs (discard 1 egg yolk)    whole wheat English Muffin with 1 tsp. low sugar jelly   Lunch   cup leftover black bean soup topped with 1-2 Tbsp. low fat cheese  2-3 light [place] Krisp crackers   Supplement Approved protein supplement (if needed between meals)   Dinner 3 oz sirloin steak    cup steamed broccoli

## 2023-03-23 NOTE — PROGRESS NOTES
"Kaci Gavin is a 42 year old who is being evaluated via a billable video visit.        Post-op Surgical Weight Loss Diet Evaluation     Assessment:  Pt presents for 16 years post-op RD visit, s/p RYGB on 2/5/2007 with Dr. Taylor. Today we reviewed current eating habits and level of physical activity, and instructed on the changes that are required for successful bariatric outcomes.    Patient Progress: Patients wants to make sure she is understanding her nutrition needs and goals. Feels like she is doing well but enjoys refreshers and reminder regarding nutritional habits. Patient feels her best at 165 lbs. Works out often and has re implemented her dietary habits.  Has a habit of grazing. Enjoys bread or carbohydrates which she claims are her \"weakness\"     Initial weight: 260 lbs  Current weight: 186 lbs  Weight change: 74 lbs down     BMI: 31 kg/m2    Patient Active Problem List   Diagnosis     Loss of hair     Dermatitis, seborrheic     Alopecia     Central centrifugal scarring alopecia     Medication monitoring encounter     Vitamin D deficiency     Pain of skin       Diabetes: No    Vitamins   Multi Vit with Iron: yes  Calcium Citrate: yes  B12: yes  D3: yes    Do you experience hunger? Yes  Do you have \"dumping\" syndrome? no     Nausea: no  Vomiting: no  Diarrhea: no  Constipation: yes -  Uses supplements       Diet Recall/Time:   Breakfast: Tukey sausage with eggs, or breakfast skillet   Lunch: turkey sausage chente with veggies   Dinner: Tilapia with rice (white) with bread.     Typical Snacks: Bread    Proteins/Veg/Fruits/CHO (NOT well tolerated): cereal with milk, no juice     Estimated protein intake: 60-80 grams    Estimated portion size per meals:1 1/2 cup/meal    Meals per week away from home: <1x per week     Meal Duration:<1 hour     Fluid-meal separation:  Fluids Are  30min before and 30 minutes after meals.    Fluid Intake  Water: Yes, very minimal   Caffeine: Bang energy drink " "  Alcohol: occasionally   Carbonation: none    Exercise: Working out 3x per week       PES statement:      (NC-1.4) Altered GI Function related to Alteration in gastrointestinal tract structure and/or function/ Decreased functional length of the GI tract as evidenced by Weight loss of 28.4% initial body weight; Gastric bypass surgery; sleeve gastrectomy      Intervention    Discussion  1. Discussed 18 months and beyond Post-Op Nutritional Guidelines for RYGB  2. Recommended to consume 15-20gm protein at 3 meals daily, along with protein supplement/\"planned protein containing snack\" of 15-30gm protein, to reach goal of 60-80 gm protein daily.  3. Reviewed lean protein sources  4. Discussed the importance of meal durations being 20-30 min long to recognize satisfaction.    Instructions  1. Include 15-20gm protein at each meal, along with protein supplement/\"planned protein containing snack\" of 15-30gm protein, to reach goal of 60-80 gm protein daily.  2. Increase fluid intake to 64oz daily: choose plain or calorie/carbonation-free beverages.  3. Incorporate daily structured activity, 30-60 minutes most days of the week  4. Read food labels more consistently: keeping total fat grams <10, total sugar grams <10, fiber >3gm per serving.  5. Increase vegetable/fruit intake, by having a vegetable or fruit with each meal daily.  6. Practice plate method: 1/2 plate lean/low fat protein source, vegetable/fruit, <25% of plate complex carbohydrates.  7. Separate fluids 30 minutes before/after meal times.  8. Practice eating off of smaller plates/bowls, chewing to applesauce consistency, taking 20-30 minutes to eat in a calm/relaxed environment without distractions of tv/email/cell phone.    Handouts provided:  18 months and beyond  Post-Op Nutritional Guidelines for RYGB    Assessment/Plan:    Pt to follow up in 3 months with bariatrician       Video-Visit Details    Type of service:  Video Visit    Video Start Time (time video " started): 2:00 pm    Video End Time (time video stopped): 2:37 PM    Originating Location (pt. Location): Home        Distant Location (provider location):  Off-site    Mode of Communication:  Video Conference via [facility]    Physician has received verbal consent for a Video Visit from the patient? Yes    Mery Preciado RD

## 2023-03-23 NOTE — LETTER
"    3/23/2023         RE: Kaci Gavin  [address] [address]        Dear Colleague,    Thank you for referring your patient, Kaci Gavin, to the [facility] SUR[facility]ATRICS [facility] [place]. Please see a copy of my visit note below.    Kaci Gavin is a 42 year old who is being evaluated via a billable video visit.        Post-op Surgical Weight Loss Diet Evaluation     Assessment:  Pt presents for 16 years post-op RD visit, s/p RYGB on 2/5/2007 with Dr. Taylor. Today we reviewed current eating habits and level of physical activity, and instructed on the changes that are required for successful bariatric outcomes.    Patient Progress: Patients wants to make sure she is understanding her nutrition needs and goals. Feels like she is doing well but enjoys refreshers and reminder regarding nutritional habits. Patient feels her best at 165 lbs. Works out often and has re implemented her dietary habits.  Has a habit of grazing. Enjoys bread or carbohydrates which she claims are her \"weakness\"     Initial weight: 260 lbs  Current weight: 186 lbs  Weight change: 74 lbs down     BMI: 31 kg/m2    Patient Active Problem List   Diagnosis     Loss of hair     Dermatitis, seborrheic     Alopecia     Central centrifugal scarring alopecia     Medication monitoring encounter     Vitamin D deficiency     Pain of skin       Diabetes: No    Vitamins   Multi Vit with Iron: yes  Calcium Citrate: yes  B12: yes  D3: yes    Do you experience hunger? Yes  Do you have \"dumping\" syndrome? no     Nausea: no  Vomiting: no  Diarrhea: no  Constipation: yes -  Uses supplements       Diet Recall/Time:   Breakfast: [place] sausage with eggs, or breakfast skillet   Lunch: turkey sausage chente with veggies   Dinner: Tilapia with rice (white) with bread.     Typical Snacks: Bread    Proteins/Veg/Fruits/CHO (NOT well tolerated): cereal with milk, no juice     Estimated protein intake: 60-80 grams    Estimated " "portion size per meals:1 1/2 cup/meal    Meals per week away from home: <1x per week     Meal Duration:<1 hour     Fluid-meal separation:  Fluids Are  30min before and 30 minutes after meals.    Fluid Intake  Water: Yes, very minimal   Caffeine: Bang energy drink   Alcohol: occasionally   Carbonation: none    Exercise: Working out 3x per week       PES statement:      (NC-1.4) Altered GI Function related to Alteration in gastrointestinal tract structure and/or function/ Decreased functional length of the GI tract as evidenced by Weight loss of 28.4% initial body weight; Gastric bypass surgery; sleeve gastrectomy      Intervention    Discussion  1. Discussed 18 months and beyond Post-Op Nutritional Guidelines for RYGB  2. Recommended to consume 15-20gm protein at 3 meals daily, along with protein supplement/\"planned protein containing snack\" of 15-30gm protein, to reach goal of 60-80 gm protein daily.  3. Reviewed lean protein sources  4. Discussed the importance of meal durations being 20-30 min long to recognize satisfaction.    Instructions  1. Include 15-20gm protein at each meal, along with protein supplement/\"planned protein containing snack\" of 15-30gm protein, to reach goal of 60-80 gm protein daily.  2. Increase fluid intake to 64oz daily: choose plain or calorie/carbonation-free beverages.  3. Incorporate daily structured activity, 30-60 minutes most days of the week  4. Read food labels more consistently: keeping total fat grams <10, total sugar grams <10, fiber >3gm per serving.  5. Increase vegetable/fruit intake, by having a vegetable or fruit with each meal daily.  6. Practice plate method: 1/2 plate lean/low fat protein source, vegetable/fruit, <25% of plate complex carbohydrates.  7. Separate fluids 30 minutes before/after meal times.  8. Practice eating off of smaller plates/bowls, chewing to applesauce consistency, taking 20-30 minutes to eat in a calm/relaxed environment without " distractions of tv/email/cell phone.    Handouts provided:  18 months and beyond  Post-Op Nutritional Guidelines for RYGB    Assessment/Plan:    Pt to follow up in 3 months with bariatrician       Video-Visit Details    Type of service:  Video Visit    Video Start Time (time video started): 2:00 pm    Video End Time (time video stopped): 2:37 PM    Originating Location (pt. Location): Home        Distant Location (provider location):  Off-site    Mode of Communication:  Video Conference via [facility]    Physician has received verbal consent for a Video Visit from the patient? Yes    Mery Preciado RD              Again, thank you for allowing me to participate in the care of your patient.        Sincerely,        Mery Preciado RD

## 2023-03-24 LAB — VIT B1 PYROPHOSHATE BLD-SCNC: 176 NMOL/L

## 2023-06-21 ENCOUNTER — VIRTUAL VISIT (OUTPATIENT)
Dept: SURGERY | Facility: CLINIC | Age: 43
End: 2023-06-21
Payer: COMMERCIAL

## 2023-06-21 VITALS — BODY MASS INDEX: 29.66 KG/M2 | HEIGHT: 65 IN | WEIGHT: 178 LBS

## 2023-06-21 DIAGNOSIS — R79.89 LOW SERUM VITAMIN A: ICD-10-CM

## 2023-06-21 DIAGNOSIS — E66.9 OBESITY (BMI 30-39.9): ICD-10-CM

## 2023-06-21 DIAGNOSIS — K91.2 POSTOPERATIVE MALABSORPTION: Primary | ICD-10-CM

## 2023-06-21 PROCEDURE — 99214 OFFICE O/P EST MOD 30 MIN: CPT | Mod: VID | Performed by: FAMILY MEDICINE

## 2023-06-21 RX ORDER — CHOLECALCIFEROL (VITAMIN D3) 125 MCG
10000 CAPSULE ORAL DAILY
Qty: 90 CAPSULE | Refills: 3 | Status: SHIPPED | OUTPATIENT
Start: 2023-06-21 | End: 2023-09-19

## 2023-06-21 RX ORDER — SEMAGLUTIDE 1.7 MG/.75ML
1.7 INJECTION, SOLUTION SUBCUTANEOUS WEEKLY
Qty: 3 ML | Refills: 0 | Status: SHIPPED | OUTPATIENT
Start: 2023-06-21 | End: 2023-07-19

## 2023-06-21 RX ORDER — HYDROXYZINE HYDROCHLORIDE 25 MG/1
25 TABLET, FILM COATED ORAL DAILY PRN
COMMUNITY
Start: 2023-06-08

## 2023-06-21 RX ORDER — SEMAGLUTIDE 2.4 MG/.75ML
2.4 INJECTION, SOLUTION SUBCUTANEOUS WEEKLY
Qty: 9 ML | Refills: 3 | Status: SHIPPED | OUTPATIENT
Start: 2023-06-21 | End: 2024-06-20

## 2023-06-21 NOTE — PROGRESS NOTES
Kaci Gavin is 42 year old  female who presents for a billable video visit today.    How would you like to obtain your AVS? MyChart  If dropped from the video visit, the video invitation should be resent by: Text to cell phone: [phone]  Will anyone else be joining your video visit? No      Video Start Time: 11:15    Are there any specific questions or needs that you would like addressed at your visit today? Paused on wegovy but wants to discuss starting again     Video-Visit Details    Type of service:  Video Visit    Platform used for Video Visit: [employer]    Video End Time (time video stopped): 11:45    Originating Location (pt. Location): Home        Distant Location (provider location):  On-site    Distant Location (provider location):  [facility] [place]

## 2023-06-21 NOTE — LETTER
6/21/2023         RE: Kaci Gavin  [address] [address]        Dear Colleague,    Thank you for referring your patient, Kaci Gavin, to the [facility] AND BARIATRICS CARE [place]. Please see a copy of my visit note below.    Bariatric Follow Up Visit with a History of Previous Bariatric Surgery     Date of visit: 6/21/2023  Physician: Ayala Gandhi MD, MD  Primary Care Provider:  Jeimy Abad  Kaci Gavin   42 year old  female    Date of Surgery: 2/5/2007  Initial Weight: 260#  Initial BMI: 43.26  Today's Weight:   Wt Readings from Last 1 Encounters:   06/21/23 80.7 kg (178 lb)     Body mass index is 29.62 kg/m .      Assessment and Plan     Assessment: Kaci is a 42 year old year old female who is 16 yrs s/p  Gilma en Y Gastric Bypass with Dr. Taylor  Kaci Gavin feels as if she  achieved the goals she hoped to accomplish through bariatric surgery and weight loss.    Encounter Diagnoses   Name Primary?     Postoperative malabsorption Yes     Low serum vitamin A      Obesity (BMI 30-39.9)          Current Outpatient Medications:      calcium citrate-vitamin D (CITRACAL) 315-5 MG-MCG TABS per tablet, Take 2 tablets by mouth, Disp: , Rfl:      Cyanocobalamin (VITAMIN B-12) 5000 MCG SUBL, , Disp: , Rfl:      ergocalciferol (ERGOCALCIFEROL) 14136 UNITS capsule, Take 1 capsule (50,000 Units) by mouth once a week, Disp: 8 capsule, Rfl: 0     Ferrous Sulfate 324 (65 FE) MG TBEC, Take 1 tablet by mouth 3 times daily, Disp: 90 tablet, Rfl: 3     fluocinolone (DERMA-SMOOTHE/FS SCALP) 0.01 % external oil, Apply 100 mLs topically daily, Disp: 1 Bottle, Rfl: 6     hydrOXYzine (ATARAX) 25 MG tablet, Take 25 mg by mouth daily as needed, Disp: , Rfl:      Multiple Vitamin (DAILY MULTIVITAMIN PO), Take by mouth daily, Disp: , Rfl:      phentermine (ADIPEX-P) 37.5 MG tablet, Take 0.5-1 tablets (18.75-37.5 mg) by mouth every morning (before breakfast) for 180  "days, Disp: 90 tablet, Rfl: 1     Semaglutide-Weight Management (WEGOVY) 1.7 MG/0.75ML pen, Inject 1.7 mg Subcutaneous once a week for 28 days, Disp: 3 mL, Rfl: 0     Semaglutide-Weight Management (WEGOVY) 2.4 MG/0.75ML pen, Inject 2.4 mg Subcutaneous once a week, Disp: 9 mL, Rfl: 3     senna (SENOKOT) 8.6 MG tablet, Take 1 tablet by mouth daily, Disp: , Rfl:      vitamin A 22389 UNITS TABS, Take 1 tablet by mouth daily, Disp: , Rfl:      vitamin A 3 MG (95341 UNITS) capsule, Take 1 capsule (10,000 Units) by mouth daily for 90 days, Disp: 90 capsule, Rfl: 3     Vitamin D3 (CHOLECALCIFEROL) 125 MCG (5000 UT) tablet, Take by mouth daily, Disp: , Rfl:      Plan: Increase MVI to 2/d and renew vitamin A for the next year. Increase Wegovy to 1.7 then 2.4. Continue phentermine. RD    Return in about 3 months (around 9/21/2023).    Bariatric Surgery Review     Interim History/LifeChanges: Doing well. Maintaining a 82# weight loss. 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 Taking vitamins with consistency. Out of her vitamin A. Father has protstate cancer and colon cancer. Stage 3 prostate cancer. Colon cancer surgery July 3rd. Weight loss is fluctuating.     Patient Concerns: doing well  Appetite (1-10): OK  GERD: no    Medication changes: no    Vitamin Intake:   B-12   SL   MVI  1/d   Vitamin D  5,000   Calcium        Other                LABS: \"Reviewed  Excelent, A borderline/low  Nausea no  Vomiting no  Constipation no  Diarrhea no  Rashes no  Hair Loss no  Calf tenderness no  Breathing difficulty no  Reactive Hypoglycemia no  Light Headedness no   Moods euthymic    12 point ROS as above and otherwise negative      Habits:  Alcohol: no  Tobacco: no  Caffeine no  NSAIDS no    Social History     Social History     Socioeconomic History     Marital status: Single     Spouse name: Not on file     Number of children: Not on file     Years of education: Not on file     Highest education level: Not on file   Occupational History     Not on " file   Tobacco Use     Smoking status: Never     Smokeless tobacco: Never   Substance and Sexual Activity     Alcohol use: Not Currently     Comment: Alcoholic Drinks/day: once per month     Drug use: Not Currently     Sexual activity: Not on file   Other Topics Concern     Parent/sibling w/ CABG, MI or angioplasty before 65F 55M? Not Asked   Social History Narrative    Single. Getting  in April 2023 after 13 years together. He has  4 children She has 1. Carlos Eduardo is 24 yo and living in [place]-going to school for Criminal Justice.  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      Social Determinants of Health     Financial Resource Strain: Not on file   Food Insecurity: Not on file   Transportation Needs: Not on file   Physical Activity: Not on file   Stress: Not on file   Social Connections: Not on file   Intimate Partner Violence: Not on file   Housing Stability: Not on file       Past Medical History     Past Medical History:   Diagnosis Date     Morbid obesity (H)     Prior to RYGB     Problem List     Patient Active Problem List   Diagnosis     Loss of hair     Dermatitis, seborrheic     Alopecia     Central centrifugal scarring alopecia     Medication monitoring encounter     Vitamin D deficiency     Pain of skin     Medications       Current Outpatient Medications:      calcium citrate-vitamin D (CITRACAL) 315-5 MG-MCG TABS per tablet, Take 2 tablets by mouth, Disp: , Rfl:      Cyanocobalamin (VITAMIN B-12) 5000 MCG SUBL, , Disp: , Rfl:      ergocalciferol (ERGOCALCIFEROL) 02329 UNITS capsule, Take 1 capsule (50,000 Units) by mouth once a week, Disp: 8 capsule, Rfl: 0     Ferrous Sulfate 324 (65 FE) MG TBEC, Take 1 tablet by mouth 3 times daily, Disp: 90 tablet, Rfl: 3     fluocinolone (DERMA-SMOOTHE/FS SCALP) 0.01 % external oil, Apply 100 mLs topically daily, Disp: 1 Bottle, Rfl: 6     hydrOXYzine (ATARAX) 25 MG tablet, Take 25 mg by mouth daily as needed,  "Disp: , Rfl:      Multiple Vitamin (DAILY MULTIVITAMIN PO), Take by mouth daily, Disp: , Rfl:      phentermine (ADIPEX-P) 37.5 MG tablet, Take 0.5-1 tablets (18.75-37.5 mg) by mouth every morning (before breakfast) for 180 days, Disp: 90 tablet, Rfl: 1     Semaglutide-Weight Management (WEGOVY) 1.7 MG/0.75ML pen, Inject 1.7 mg Subcutaneous once a week for 28 days, Disp: 3 mL, Rfl: 0     Semaglutide-Weight Management (WEGOVY) 2.4 MG/0.75ML pen, Inject 2.4 mg Subcutaneous once a week, Disp: 9 mL, Rfl: 3     senna (SENOKOT) 8.6 MG tablet, Take 1 tablet by mouth daily, Disp: , Rfl:      vitamin A 18534 UNITS TABS, Take 1 tablet by mouth daily, Disp: , Rfl:      vitamin A 3 MG (26060 UNITS) capsule, Take 1 capsule (10,000 Units) by mouth daily for 90 days, Disp: 90 capsule, Rfl: 3     Vitamin D3 (CHOLECALCIFEROL) 125 MCG (5000 UT) tablet, Take by mouth daily, Disp: , Rfl:    Surgical History     Past Surgical History  She has a past surgical history that includes Revision Gilma-En-Y (02/05/2007); Endometrial Ablation; and tubal ligation.    Objective-Exam     Constitutional:  Ht 1.651 m (5' 5\")   Wt 80.7 kg (178 lb)   BMI 29.62 kg/m      General:  Pleasant and in no acute distress  Respiratory: Normal respiratory effort, no cough, wheezes or crackles  Psychiatric: alert and oriented X3, mood and affect normal    Counseling     We reviewed the important post op bariatric recommendations:  -eating 3 meals daily  -eating protein first, getting >60gm protein daily  -eating slowly, chewing food well  -avoiding/limiting calorie containing beverages  -drinking water 15-30 minutes before or after meals  -choosing wheat, not white with breads, crackers, pastas, criselda, bagels, tortillas, rice  -limiting restaurant or cafeteria eating to twice a week or less    We discussed the importance of restorative sleep and stress management in maintaining a healthy weight.  We discussed the National Weight Control Registry healthy weight " maintenance strategies and ways to optimize metabolism.  We discussed the importance of physical activity including cardiovascular and strength training in maintaining a healthier weight.    We discussed the importance of life-long vitamin supplementation and life-long  follow-up.    Kaci was reminded that, to avoid marginal ulcers she should avoid tobacco at all, alcohol in excess, caffeine in excess, and NSAIDS (unless indicated for cardioprotection or othewise and opposed by a PPI).    Ayala Gandhi MD, MD, [facility].  6/21/2023  8:31 AM      No images are attached to the encounter.  Total time spent on the date of this encounter doing: chart review, review of test results, patient visit, physical exam, education, counseling, developing plan of care, and documenting = 30 minutes.    Kaci Gavin is 42 year old  female who presents for a billable video visit today.    How would you like to obtain your AVS? MyChart  If dropped from the video visit, the video invitation should be resent by: Text to cell phone: [phone]  Will anyone else be joining your video visit? No      Video Start Time: 11:15    Are there any specific questions or needs that you would like addressed at your visit today? Paused on wegovy but wants to discuss starting again     Video-Visit Details    Type of service:  Video Visit    Platform used for Video Visit: Plexxi    Video End Time (time video stopped): 11:45    Originating Location (pt. Location): Home        Distant Location (provider location):  On-site    Distant Location (provider location):  [facility] SURGERY [facility] AND BARIATRICS CARE [place]       Again, thank you for allowing me to participate in the care of your patient.        Sincerely,        Ayala Gandhi MD

## 2023-06-21 NOTE — PROGRESS NOTES
Bariatric Follow Up Visit with a History of Previous Bariatric Surgery     Date of visit: 6/21/2023  Physician: Ayala Gandhi MD, MD  Primary Care Provider:  Jeimy Abad   42 year old  female    Date of Surgery: 2/5/2007  Initial Weight: 260#  Initial BMI: 43.26  Today's Weight:   Wt Readings from Last 1 Encounters:   06/21/23 80.7 kg (178 lb)     Body mass index is 29.62 kg/m .      Assessment and Plan     Assessment: Kaci is a 42 year old year old female who is 16 yrs s/p  Gilma en Y Gastric Bypass with Dr. Claudia Gavin feels as if she  achieved the goals she hoped to accomplish through bariatric surgery and weight loss.    Encounter Diagnoses   Name Primary?     Postoperative malabsorption Yes     Low serum vitamin A      Obesity (BMI 30-39.9)          Current Outpatient Medications:      calcium citrate-vitamin D (CITRACAL) 315-5 MG-MCG TABS per tablet, Take 2 tablets by mouth, Disp: , Rfl:      Cyanocobalamin (VITAMIN B-12) 5000 MCG SUBL, , Disp: , Rfl:      ergocalciferol (ERGOCALCIFEROL) 86663 UNITS capsule, Take 1 capsule (50,000 Units) by mouth once a week, Disp: 8 capsule, Rfl: 0     Ferrous Sulfate 324 (65 FE) MG TBEC, Take 1 tablet by mouth 3 times daily, Disp: 90 tablet, Rfl: 3     fluocinolone (DERMA-SMOOTHE/FS SCALP) 0.01 % external oil, Apply 100 mLs topically daily, Disp: 1 Bottle, Rfl: 6     hydrOXYzine (ATARAX) 25 MG tablet, Take 25 mg by mouth daily as needed, Disp: , Rfl:      Multiple Vitamin (DAILY MULTIVITAMIN PO), Take by mouth daily, Disp: , Rfl:      phentermine (ADIPEX-P) 37.5 MG tablet, Take 0.5-1 tablets (18.75-37.5 mg) by mouth every morning (before breakfast) for 180 days, Disp: 90 tablet, Rfl: 1     Semaglutide-Weight Management (WEGOVY) 1.7 MG/0.75ML pen, Inject 1.7 mg Subcutaneous once a week for 28 days, Disp: 3 mL, Rfl: 0     Semaglutide-Weight Management (WEGOVY) 2.4 MG/0.75ML pen, Inject 2.4 mg Subcutaneous once a week, Disp: 9 mL,  "Rfl: 3     senna (SENOKOT) 8.6 MG tablet, Take 1 tablet by mouth daily, Disp: , Rfl:      vitamin A 23906 UNITS TABS, Take 1 tablet by mouth daily, Disp: , Rfl:      vitamin A 3 MG (13899 UNITS) capsule, Take 1 capsule (10,000 Units) by mouth daily for 90 days, Disp: 90 capsule, Rfl: 3     Vitamin D3 (CHOLECALCIFEROL) 125 MCG (5000 UT) tablet, Take by mouth daily, Disp: , Rfl:      Plan: Increase MVI to 2/d and renew vitamin A for the next year. Increase Wegovy to 1.7 then 2.4. Continue phentermine. RD    Return in about 3 months (around 9/21/2023).    Bariatric Surgery Review     Interim History/LifeChanges: Doing well. Maintaining a 82# weight loss. 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 Taking vitamins with consistency. Out of her vitamin A. Father has protstate cancer and colon cancer. Stage 3 prostate cancer. Colon cancer surgery July 3rd. Weight loss is fluctuating.     Patient Concerns: doing well  Appetite (1-10): OK  GERD: no    Medication changes: no    Vitamin Intake:   B-12   SL   MVI  1/d   Vitamin D  5,000   Calcium        Other                LABS: \"Reviewed  Excelent, A borderline/low  Nausea no  Vomiting no  Constipation no  Diarrhea no  Rashes no  Hair Loss no  Calf tenderness no  Breathing difficulty no  Reactive Hypoglycemia no  Light Headedness no   Moods euthymic    12 point ROS as above and otherwise negative      Habits:  Alcohol: no  Tobacco: no  Caffeine no  NSAIDS no    Social History     Social History     Socioeconomic History     Marital status: Single     Spouse name: Not on file     Number of children: Not on file     Years of education: Not on file     Highest education level: Not on file   Occupational History     Not on file   Tobacco Use     Smoking status: Never     Smokeless tobacco: Never   Substance and Sexual Activity     Alcohol use: Not Currently     Comment: Alcoholic Drinks/day: once per month     Drug use: Not Currently     Sexual activity: Not on file   Other Topics Concern     " Parent/sibling w/ CABG, MI or angioplasty before 65F 55M? Not Asked   Social History Narrative    Single. Getting  in April 2023 after 13 years together. He has  4 children She has 1. Carlos Eduardo is 22 yo and living in [place]-going to school for Criminal Justice.  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      Social Determinants of Health     Financial Resource Strain: Not on file   Food Insecurity: Not on file   Transportation Needs: Not on file   Physical Activity: Not on file   Stress: Not on file   Social Connections: Not on file   Intimate Partner Violence: Not on file   Housing Stability: Not on file       Past Medical History     Past Medical History:   Diagnosis Date     Morbid obesity (H)     Prior to RYGB     Problem List     Patient Active Problem List   Diagnosis     Loss of hair     Dermatitis, seborrheic     Alopecia     Central centrifugal scarring alopecia     Medication monitoring encounter     Vitamin D deficiency     Pain of skin     Medications       Current Outpatient Medications:      calcium citrate-vitamin D (CITRACAL) 315-5 MG-MCG TABS per tablet, Take 2 tablets by mouth, Disp: , Rfl:      Cyanocobalamin (VITAMIN B-12) 5000 MCG SUBL, , Disp: , Rfl:      ergocalciferol (ERGOCALCIFEROL) 34632 UNITS capsule, Take 1 capsule (50,000 Units) by mouth once a week, Disp: 8 capsule, Rfl: 0     Ferrous Sulfate 324 (65 FE) MG TBEC, Take 1 tablet by mouth 3 times daily, Disp: 90 tablet, Rfl: 3     fluocinolone (DERMA-SMOOTHE/FS SCALP) 0.01 % external oil, Apply 100 mLs topically daily, Disp: 1 Bottle, Rfl: 6     hydrOXYzine (ATARAX) 25 MG tablet, Take 25 mg by mouth daily as needed, Disp: , Rfl:      Multiple Vitamin (DAILY MULTIVITAMIN PO), Take by mouth daily, Disp: , Rfl:      phentermine (ADIPEX-P) 37.5 MG tablet, Take 0.5-1 tablets (18.75-37.5 mg) by mouth every morning (before breakfast) for 180 days, Disp: 90 tablet, Rfl: 1     Semaglutide-Weight  "Management (WEGOVY) 1.7 MG/0.75ML pen, Inject 1.7 mg Subcutaneous once a week for 28 days, Disp: 3 mL, Rfl: 0     Semaglutide-Weight Management (WEGOVY) 2.4 MG/0.75ML pen, Inject 2.4 mg Subcutaneous once a week, Disp: 9 mL, Rfl: 3     senna (SENOKOT) 8.6 MG tablet, Take 1 tablet by mouth daily, Disp: , Rfl:      vitamin A 74229 UNITS TABS, Take 1 tablet by mouth daily, Disp: , Rfl:      vitamin A 3 MG (33840 UNITS) capsule, Take 1 capsule (10,000 Units) by mouth daily for 90 days, Disp: 90 capsule, Rfl: 3     Vitamin D3 (CHOLECALCIFEROL) 125 MCG (5000 UT) tablet, Take by mouth daily, Disp: , Rfl:    Surgical History     Past Surgical History  She has a past surgical history that includes Revision Gilma-En-Y (02/05/2007); Endometrial Ablation; and tubal ligation.    Objective-Exam     Constitutional:  Ht 1.651 m (5' 5\")   Wt 80.7 kg (178 lb)   BMI 29.62 kg/m      General:  Pleasant and in no acute distress  Respiratory: Normal respiratory effort, no cough, wheezes or crackles  Psychiatric: alert and oriented X3, mood and affect normal    Counseling     We reviewed the important post op bariatric recommendations:  -eating 3 meals daily  -eating protein first, getting >60gm protein daily  -eating slowly, chewing food well  -avoiding/limiting calorie containing beverages  -drinking water 15-30 minutes before or after meals  -choosing wheat, not white with breads, crackers, pastas, criselda, bagels, tortillas, rice  -limiting restaurant or cafeteria eating to twice a week or less    We discussed the importance of restorative sleep and stress management in maintaining a healthy weight.  We discussed the National Weight Control Registry healthy weight maintenance strategies and ways to optimize metabolism.  We discussed the importance of physical activity including cardiovascular and strength training in maintaining a healthier weight.    We discussed the importance of life-long vitamin supplementation and life-long  " follow-up.    Kaci was reminded that, to avoid marginal ulcers she should avoid tobacco at all, alcohol in excess, caffeine in excess, and NSAIDS (unless indicated for cardioprotection or othewise and opposed by a PPI).    Ayala Gandhi MD, MD, [facility].  6/21/2023  8:31 AM      No images are attached to the encounter.  Total time spent on the date of this encounter doing: chart review, review of test results, patient visit, physical exam, education, counseling, developing plan of care, and documenting = 30 minutes.

## 2023-07-23 ENCOUNTER — HEALTH MAINTENANCE LETTER (OUTPATIENT)
Age: 43
End: 2023-07-23

## 2023-09-08 ENCOUNTER — TELEPHONE (OUTPATIENT)
Dept: SURGERY | Facility: CLINIC | Age: 43
End: 2023-09-08
Payer: COMMERCIAL

## 2023-09-08 NOTE — TELEPHONE ENCOUNTER
Prior Authorization Retail Medication Request    Medication/Dose: Phentermine HCI 37.5mg Tablets  Previously Tried and Failed:  Pt has been taking this medication.  Rationale:  Previous PA  in 2023    Key: [id number]    Pharmacy Information (if different than what is on RX)  [facility]  Phone:  [phone]

## 2023-09-12 NOTE — TELEPHONE ENCOUNTER
Central Prior Authorization Team   Phone: [phone]    PA Initiation    Medication: Phentermine HCI 37.5mg Tablets  Insurance Company: [employer] - Phone [phone] Fax [phone]  Pharmacy [facility]illing the Rx: [id number] [place], [place] - [address] [facility] [address] [place]  Filling Pharmacy Phone: [phone]  Filling Pharmacy Fax:    Start Date: 9/12/2023

## 2023-09-13 NOTE — TELEPHONE ENCOUNTER
PRIOR AUTHORIZATION DENIED    Medication: Phentermine HCI 37.5mg Tablets    Denial Date: 9/13/2023    Denial Rational: Insurance will only cover 3 months of medication per year        Appeal Information: Review the plan's reasons for denial listed above. Please utilize that information to complete letter and provide specific, detailed clinical information/rationale of your patient's health status to address their denial reasons.

## 2023-10-06 DIAGNOSIS — E66.811 CLASS 1 OBESITY WITH BODY MASS INDEX (BMI) OF 30.0 TO 30.9 IN ADULT, UNSPECIFIED OBESITY TYPE, UNSPECIFIED WHETHER SERIOUS COMORBIDITY PRESENT: ICD-10-CM

## 2023-10-06 RX ORDER — PHENTERMINE HYDROCHLORIDE 37.5 MG/1
TABLET ORAL
Qty: 90 TABLET | Refills: 1 | Status: SHIPPED | OUTPATIENT
Start: 2023-10-06 | End: 2023-10-16

## 2023-10-16 ENCOUNTER — TELEPHONE (OUTPATIENT)
Dept: SURGERY | Facility: CLINIC | Age: 43
End: 2023-10-16
Payer: COMMERCIAL

## 2023-10-16 DIAGNOSIS — E66.811 CLASS 1 OBESITY WITH BODY MASS INDEX (BMI) OF 30.0 TO 30.9 IN ADULT, UNSPECIFIED OBESITY TYPE, UNSPECIFIED WHETHER SERIOUS COMORBIDITY PRESENT: ICD-10-CM

## 2023-10-16 NOTE — TELEPHONE ENCOUNTER
Spoke to the patient and she will continue with the phentermine for now and pay for it out of pocket but would like for it to be switched to the [facility] since [facility].  She will wait on the Wegovy until it is in stock again.  Millie Romeo RN

## 2023-10-16 NOTE — PROGRESS NOTES
Patient wanting to have prescription changed to the [facility] near her because it is half the price compared to the [facility].  Millie Romeo RN

## 2023-10-16 NOTE — TELEPHONE ENCOUNTER
General Call    Contacts         Type Contact Phone/Fax    10/16/2023 09:17 AM CDT Phone (Incoming) Kaci Gavin (Self) [phone] (M)          Reason for Call:  Wegovy / Phentermine    What are your questions or concerns:  pt would like a call back regarding Wegovy / Phentermine         Could we send this information to you in [facility] or would you prefer to receive a phone call?:   Patient would prefer a phone call   Okay to leave a detailed message?: Yes at Cell number on file:    Telephone Information:   Mobile [phone]

## 2023-10-27 ENCOUNTER — TELEPHONE (OUTPATIENT)
Dept: SURGERY | Facility: CLINIC | Age: 43
End: 2023-10-27
Payer: COMMERCIAL

## 2023-10-27 NOTE — TELEPHONE ENCOUNTER
Prior Authorization Retail Medication Request    Medication/Dose: Wegovy 2.4mg/0.75ml PF PEN INJ  Previously Tried and Failed:  Weight loss surgery, diet, exercise.   Rationale:  s/p RNY 16 years ago.    Insurance Name:  [employer]  Insurance ID:  [id number]    [place] [id number]

## 2023-11-01 NOTE — TELEPHONE ENCOUNTER
Central Prior Authorization Team   Phone: [phone]    PA Initiation    Medication: Wegovy 2.4mg/0.75ml PF PEN INJ  Insurance Company: [employer] - Phone [phone] Fax [phone]  Pharmacy Filling the Rx: [id number] [place], [place] - [address] [facility] [address] [place]  Filling Pharmacy Phone: [phone]  Filling Pharmacy Fax:    Start Date: 11/1/2023

## 2023-11-02 NOTE — TELEPHONE ENCOUNTER
PRIOR AUTHORIZATION DENIED    Medication: Wegovy 2.4mg/0.75ml PF PEN INJ    Denial Date: 11/2/2023    Denial Rational: Patient's starting weight was 186lbs and current weight was reported at 185 she has not lost 5% of starting weight          Appeal Information: Review the plan's reasons for denial listed above. Please utilize that information to complete letter and provide specific, detailed clinical information/rationale of your patient's health status to address their denial reasons.

## 2023-11-03 ENCOUNTER — HOSPITAL ENCOUNTER (OUTPATIENT)
Dept: MAMMOGRAPHY | Facility: CLINIC | Age: 43
Discharge: HOME | End: 2023-11-03
Admitting: NURSE PRACTITIONER
Payer: COMMERCIAL

## 2023-11-03 DIAGNOSIS — Z12.31 VISIT FOR SCREENING MAMMOGRAM: ICD-10-CM

## 2023-11-03 PROCEDURE — 77063 BREAST TOMOSYNTHESIS BI: CPT

## 2023-11-06 NOTE — TELEPHONE ENCOUNTER
Message sent to pt.    Beatriz Rivas RN, CBN  [facility]  P [phone]  F [phone]

## 2023-11-07 RX ORDER — PHENTERMINE HYDROCHLORIDE 37.5 MG/1
TABLET ORAL
Qty: 90 TABLET | Refills: 1 | Status: SHIPPED | OUTPATIENT
Start: 2023-11-07 | End: 2023-11-10

## 2023-11-10 ENCOUNTER — VIRTUAL VISIT (OUTPATIENT)
Dept: SURGERY | Facility: CLINIC | Age: 43
End: 2023-11-10
Payer: COMMERCIAL

## 2023-11-10 VITALS — HEIGHT: 65 IN | WEIGHT: 178 LBS | BODY MASS INDEX: 29.66 KG/M2

## 2023-11-10 DIAGNOSIS — K91.2 POSTOPERATIVE MALABSORPTION: Primary | ICD-10-CM

## 2023-11-10 DIAGNOSIS — E66.811 CLASS 1 OBESITY WITH BODY MASS INDEX (BMI) OF 30.0 TO 30.9 IN ADULT, UNSPECIFIED OBESITY TYPE, UNSPECIFIED WHETHER SERIOUS COMORBIDITY PRESENT: ICD-10-CM

## 2023-11-10 PROCEDURE — 99213 OFFICE O/P EST LOW 20 MIN: CPT | Mod: VID | Performed by: FAMILY MEDICINE

## 2023-11-10 RX ORDER — SEMAGLUTIDE 2.4 MG/.75ML
2.4 INJECTION, SOLUTION SUBCUTANEOUS WEEKLY
Qty: 9 ML | Refills: 3 | Status: SHIPPED | OUTPATIENT
Start: 2023-11-10 | End: 2024-11-09

## 2023-11-10 RX ORDER — SEMAGLUTIDE 1 MG/.5ML
1 INJECTION, SOLUTION SUBCUTANEOUS WEEKLY
Qty: 2 ML | Refills: 0 | Status: SHIPPED | OUTPATIENT
Start: 2023-11-10 | End: 2023-12-08

## 2023-11-10 RX ORDER — PHENTERMINE HYDROCHLORIDE 37.5 MG/1
TABLET ORAL
Qty: 90 TABLET | Refills: 1 | Status: SHIPPED | OUTPATIENT
Start: 2023-11-10

## 2023-11-10 RX ORDER — SEMAGLUTIDE 0.5 MG/.5ML
0.5 INJECTION, SOLUTION SUBCUTANEOUS WEEKLY
Qty: 2 ML | Refills: 0 | Status: SHIPPED | OUTPATIENT
Start: 2023-11-10 | End: 2023-12-08

## 2023-11-10 RX ORDER — SEMAGLUTIDE 1.7 MG/.75ML
1.7 INJECTION, SOLUTION SUBCUTANEOUS WEEKLY
Qty: 3 ML | Refills: 0 | Status: SHIPPED | OUTPATIENT
Start: 2023-11-10 | End: 2023-12-08

## 2023-11-10 RX ORDER — SEMAGLUTIDE 0.25 MG/.5ML
0.25 INJECTION, SOLUTION SUBCUTANEOUS WEEKLY
Qty: 2 ML | Refills: 0 | Status: SHIPPED | OUTPATIENT
Start: 2023-11-10 | End: 2023-12-08

## 2023-11-10 ASSESSMENT — PAIN SCALES - GENERAL: PAINLEVEL: NO PAIN (0)

## 2023-11-10 NOTE — PROGRESS NOTES
Virtual Visit Details    Type of service:  Video Visit     Originating Location (pt. Location): Home    Distant Location (provider location):  On-site  Platform used for Video Visit: Kallie Galvan due to lost audio      Bariatric Follow Up Visit with a History of Previous Bariatric Surgery     Date of visit: 11/10/2023  Physician: Ayala Gandhi MD, MD  Primary Care Provider:  Jeimy Abad   43 year old  female    Date of Surgery: 2/5/2007  Initial Weight: 260#  Initial BMI: 43.26  Today's Weight:   Wt Readings from Last 1 Encounters:   11/10/23 80.7 kg (178 lb)     Body mass index is 30.08 kg/m .      Assessment and Plan     Assessment: Kaci is a 43 year old year old female who is 16 yrs s/p  Gilma en Y Gastric Bypass with Dr. Claudia Gavin feels as if she has achieved the goals she hoped to accomplish through bariatric surgery and weight loss.    Encounter Diagnoses   Name Primary?    Postoperative malabsorption Yes    Class 1 obesity with body mass index (BMI) of 30.0 to 30.9 in adult, unspecified obesity type, unspecified whether serious comorbidity present          Current Outpatient Medications:     phentermine (ADIPEX-P) 37.5 MG tablet, TAKE 1/2 TO 1 TABLET(18.75 TO 37.5 MG) BY MOUTH EVERY MORNING BEFORE BREAKFAST, Disp: 90 tablet, Rfl: 1    Semaglutide-Weight Management (WEGOVY) 0.25 MG/0.5ML pen, Inject 0.25 mg Subcutaneous once a week for 28 days, Disp: 2 mL, Rfl: 0    Semaglutide-Weight Management (WEGOVY) 0.5 MG/0.5ML pen, Inject 0.5 mg Subcutaneous once a week for 28 days, Disp: 2 mL, Rfl: 0    Semaglutide-Weight Management (WEGOVY) 1 MG/0.5ML pen, Inject 1 mg Subcutaneous once a week for 28 days, Disp: 2 mL, Rfl: 0    Semaglutide-Weight Management (WEGOVY) 1.7 MG/0.75ML pen, Inject 1.7 mg Subcutaneous once a week for 28 days, Disp: 3 mL, Rfl: 0    Semaglutide-Weight Management (WEGOVY) 2.4 MG/0.75ML pen, Inject 2.4 mg Subcutaneous once a week, Disp: 9 mL,  "Rfl: 3    calcium citrate-vitamin D (CITRACAL) 315-5 MG-MCG TABS per tablet, Take 2 tablets by mouth, Disp: , Rfl:     Cyanocobalamin (VITAMIN B-12) 5000 MCG SUBL, , Disp: , Rfl:     ergocalciferol (ERGOCALCIFEROL) 89456 UNITS capsule, Take 1 capsule (50,000 Units) by mouth once a week, Disp: 8 capsule, Rfl: 0    Ferrous Sulfate 324 (65 FE) MG TBEC, Take 1 tablet by mouth 3 times daily, Disp: 90 tablet, Rfl: 3    fluocinolone (DERMA-SMOOTHE/FS SCALP) 0.01 % external oil, Apply 100 mLs topically daily, Disp: 1 Bottle, Rfl: 6    hydrOXYzine (ATARAX) 25 MG tablet, Take 25 mg by mouth daily as needed, Disp: , Rfl:     Multiple Vitamin (DAILY MULTIVITAMIN PO), Take by mouth daily, Disp: , Rfl:     Semaglutide-Weight Management (WEGOVY) 2.4 MG/0.75ML pen, Inject 2.4 mg Subcutaneous once a week, Disp: 9 mL, Rfl: 3    senna (SENOKOT) 8.6 MG tablet, Take 1 tablet by mouth daily, Disp: , Rfl:     vitamin A 50599 UNITS TABS, Take 1 tablet by mouth daily, Disp: , Rfl:     Vitamin D3 (CHOLECALCIFEROL) 125 MCG (5000 UT) tablet, Take by mouth daily, Disp: , Rfl:     Plan: restart phentermine. Restart Wegovy at the 0.25mg weekly and ramp up. Getting back to the gym will help with restorative sleep and energy. RD then annual f/up with labs. We discussed the importance of hydration, staying ahead of potential constipation, and consuming 3 protein containing, nutrient dense meals while taking GLP-1 RA medications.      Return in about 5 months (around 4/10/2024) for RD then me for annual RYGB follow up with labs in March or so.    Bariatric Surgery Review     Interim History/LifeChanges: not workin gout for three  weeks. Extremely tired. Wegovy last took in September.     Patient Concerns: weight gain, profound fatigue  Appetite (1-10): up  GERD: no    Medication changes: off of phentermine and Wegovy    Vitamin Intake:   B-12   SL   MVI  2/d   Vitamin D  5,000   Calcium        Other  iron              LABS: \"Reviewed  From " March  Nausea no  Vomiting no  Constipation no  Diarrhea no  Rashes no  Hair Loss no  Calf tenderness no  Breathing difficulty no  Reactive Hypoglycemia avoids  Light Headedness no   Moods euthymic    12 point ROS as above and otherwise negative      Habits:  Alcohol: no  Tobacco: no  Caffeine no  NSAIDS no  Exercise Routine: none for 3 weeks. Getting back to the gym tomorrow  3 meals/day yes  Protein first yes  60grams/day  Water Separate from meals yes  Calorie Containing Beverages no  Restaurant eating/wk <2  Sleeping OK  Stress high  CPAP: NA  Contraception:   DEXA:not indicated for age <45    Social History     Social History     Socioeconomic History    Marital status:      Spouse name: Not on file    Number of children: Not on file    Years of education: Not on file    Highest education level: Not on file   Occupational History    Not on file   Tobacco Use    Smoking status: Never    Smokeless tobacco: Never   Substance and Sexual Activity    Alcohol use: Not Currently     Comment: Alcoholic Drinks/day: once per month    Drug use: Not Currently    Sexual activity: Not on file   Other Topics Concern    Parent/sibling w/ CABG, MI or angioplasty before 65F 55M? Not Asked   Social History Narrative    Single. Getting  in April 2023 after 13 years together. He has  4 children She has 1. Carlos Eduardo is 24 yo and living in [place]-going to school for Criminal Justice.  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      Social Determinants of Health     Financial Resource Strain: Not on file   Food Insecurity: Not on file   Transportation Needs: Not on file   Physical Activity: Not on file   Stress: Not on file   Social Connections: Not on file   Interpersonal Safety: Not on file   Housing Stability: Not on file       Past Medical History     Past Medical History:   Diagnosis Date    Morbid obesity (H)     Prior to RYGB     Problem List     Patient Active Problem List    Diagnosis    Loss of hair    Dermatitis, seborrheic    Alopecia    Central centrifugal scarring alopecia    Medication monitoring encounter    Vitamin D deficiency    Pain of skin     Medications       Current Outpatient Medications:     phentermine (ADIPEX-P) 37.5 MG tablet, TAKE 1/2 TO 1 TABLET(18.75 TO 37.5 MG) BY MOUTH EVERY MORNING BEFORE BREAKFAST, Disp: 90 tablet, Rfl: 1    Semaglutide-Weight Management (WEGOVY) 0.25 MG/0.5ML pen, Inject 0.25 mg Subcutaneous once a week for 28 days, Disp: 2 mL, Rfl: 0    Semaglutide-Weight Management (WEGOVY) 0.5 MG/0.5ML pen, Inject 0.5 mg Subcutaneous once a week for 28 days, Disp: 2 mL, Rfl: 0    Semaglutide-Weight Management (WEGOVY) 1 MG/0.5ML pen, Inject 1 mg Subcutaneous once a week for 28 days, Disp: 2 mL, Rfl: 0    Semaglutide-Weight Management (WEGOVY) 1.7 MG/0.75ML pen, Inject 1.7 mg Subcutaneous once a week for 28 days, Disp: 3 mL, Rfl: 0    Semaglutide-Weight Management (WEGOVY) 2.4 MG/0.75ML pen, Inject 2.4 mg Subcutaneous once a week, Disp: 9 mL, Rfl: 3    calcium citrate-vitamin D (CITRACAL) 315-5 MG-MCG TABS per tablet, Take 2 tablets by mouth, Disp: , Rfl:     Cyanocobalamin (VITAMIN B-12) 5000 MCG SUBL, , Disp: , Rfl:     ergocalciferol (ERGOCALCIFEROL) 80367 UNITS capsule, Take 1 capsule (50,000 Units) by mouth once a week, Disp: 8 capsule, Rfl: 0    Ferrous Sulfate 324 (65 FE) MG TBEC, Take 1 tablet by mouth 3 times daily, Disp: 90 tablet, Rfl: 3    fluocinolone (DERMA-SMOOTHE/FS SCALP) 0.01 % external oil, Apply 100 mLs topically daily, Disp: 1 Bottle, Rfl: 6    hydrOXYzine (ATARAX) 25 MG tablet, Take 25 mg by mouth daily as needed, Disp: , Rfl:     Multiple Vitamin (DAILY MULTIVITAMIN PO), Take by mouth daily, Disp: , Rfl:     Semaglutide-Weight Management (WEGOVY) 2.4 MG/0.75ML pen, Inject 2.4 mg Subcutaneous once a week, Disp: 9 mL, Rfl: 3    senna (SENOKOT) 8.6 MG tablet, Take 1 tablet by mouth daily, Disp: , Rfl:     vitamin A 99404 UNITS TABS,  "Take 1 tablet by mouth daily, Disp: , Rfl:     Vitamin D3 (CHOLECALCIFEROL) 125 MCG (5000 UT) tablet, Take by mouth daily, Disp: , Rfl:    Surgical History     Past Surgical History  She has a past surgical history that includes Revision Gilma-En-Y (02/05/2007); Endometrial Ablation; and tubal ligation.    Objective-Exam     Constitutional:  Ht 1.638 m (5' 4.5\")   Wt 80.7 kg (178 lb)   BMI 30.08 kg/m    General:  Pleasant and in no acute distress     Psychiatric: alert and oriented X3, mood and affect normal    Counseling     We reviewed the important post op bariatric recommendations:  -eating 3 meals daily  -eating protein first, getting >60gm protein daily  -eating slowly, chewing food well  -avoiding/limiting calorie containing beverages  -drinking water 15-30 minutes before or after meals  -choosing wheat, not white with breads, crackers, pastas, criselda, bagels, tortillas, rice  -limiting restaurant or cafeteria eating to twice a week or less    We discussed the importance of restorative sleep and stress management in maintaining a healthy weight.  We discussed the National Weight Control Registry healthy weight maintenance strategies and ways to optimize metabolism.  We discussed the importance of physical activity including cardiovascular and strength training in maintaining a healthier weight.    We discussed the importance of life-long vitamin supplementation and life-long  follow-up.    Kaci was reminded that, to avoid marginal ulcers she should avoid tobacco at all, alcohol in excess, caffeine in excess, and NSAIDS (unless indicated for cardioprotection or othewise and opposed by a PPI).    Ayala Gandhi MD, MD, [facility]  [facility].  11/10/2023  11:16 AM  Total time spent on the date of this encounter doing: chart review, review of test results, patient visit, physical exam, education, counseling, developing plan of care, and documenting = 20 minutes.  "

## 2023-11-10 NOTE — NURSING NOTE
Is the patient currently in the state of [place]? YES    Visit mode:VIDEO    If the visit is dropped, the patient can be reconnected by: VIDEO VISIT: Text to cell phone:   Telephone Information:   Mobile [phone]       Will anyone else be joining the visit? NO  (If patient encounters technical issues they should call [phone]    How would you like to obtain your AVS? MyChart    Are changes needed to the allergy or medication list? No    Reason for visit: RECHECK    Dany BA

## 2023-11-10 NOTE — LETTER
11/10/2023         RE: Kaci Gavin  [address] [address]        Dear Colleague,    Thank you for referring your patient, Kaci Gavin, to the [facility] [place]. Please see a copy of my visit note below.    Virtual Visit Details    Type of service:  Video Visit     Originating Location (pt. Location): Home  {PROVIDER LOCATION On-site should be selected for visits conducted from your clinic location or adjoining [facility] hospital, academic office, or other nearby [facility] building. Off-site should be selected for all other provider locations, including home:[id number]}  Distant Location (provider location):  On-site  Platform used for Video Visit: Kallie pagan Doximclifford due to lost audio      Bariatric Follow Up Visit with a History of Previous Bariatric Surgery     Date of visit: 11/10/2023  Physician: Ayala Gandhi MD, MD  Primary Care Provider:  Jeimy Abad   43 year old  female    Date of Surgery: 2/5/2007  Initial Weight: 260#  Initial BMI: 43.26  Today's Weight:   Wt Readings from Last 1 Encounters:   11/10/23 80.7 kg (178 lb)     Body mass index is 30.08 kg/m .      Assessment and Plan     Assessment: Kaci is a 43 year old year old female who is 16 yrs s/p  Gilma en Y Gastric Bypass with Dr. Claudia Zhoulove Gavin feels as if she has achieved the goals she hoped to accomplish through bariatric surgery and weight loss.    Encounter Diagnoses   Name Primary?     Postoperative malabsorption Yes     Class 1 obesity with body mass index (BMI) of 30.0 to 30.9 in adult, unspecified obesity type, unspecified whether serious comorbidity present          Current Outpatient Medications:      phentermine (ADIPEX-P) 37.5 MG tablet, TAKE 1/2 TO 1 TABLET(18.75 TO 37.5 MG) BY MOUTH EVERY MORNING BEFORE BREAKFAST, Disp: 90 tablet, Rfl: 1     Semaglutide-Weight Management (WEGOVY) 0.25 MG/0.5ML pen, Inject 0.25 mg Subcutaneous once a week  for 28 days, Disp: 2 mL, Rfl: 0     Semaglutide-Weight Management (WEGOVY) 0.5 MG/0.5ML pen, Inject 0.5 mg Subcutaneous once a week for 28 days, Disp: 2 mL, Rfl: 0     Semaglutide-Weight Management (WEGOVY) 1 MG/0.5ML pen, Inject 1 mg Subcutaneous once a week for 28 days, Disp: 2 mL, Rfl: 0     Semaglutide-Weight Management (WEGOVY) 1.7 MG/0.75ML pen, Inject 1.7 mg Subcutaneous once a week for 28 days, Disp: 3 mL, Rfl: 0     Semaglutide-Weight Management (WEGOVY) 2.4 MG/0.75ML pen, Inject 2.4 mg Subcutaneous once a week, Disp: 9 mL, Rfl: 3     calcium citrate-vitamin D (CITRACAL) 315-5 MG-MCG TABS per tablet, Take 2 tablets by mouth, Disp: , Rfl:      Cyanocobalamin (VITAMIN B-12) 5000 MCG SUBL, , Disp: , Rfl:      ergocalciferol (ERGOCALCIFEROL) 67088 UNITS capsule, Take 1 capsule (50,000 Units) by mouth once a week, Disp: 8 capsule, Rfl: 0     Ferrous Sulfate 324 (65 FE) MG TBEC, Take 1 tablet by mouth 3 times daily, Disp: 90 tablet, Rfl: 3     fluocinolone (DERMA-SMOOTHE/FS SCALP) 0.01 % external oil, Apply 100 mLs topically daily, Disp: 1 Bottle, Rfl: 6     hydrOXYzine (ATARAX) 25 MG tablet, Take 25 mg by mouth daily as needed, Disp: , Rfl:      Multiple Vitamin (DAILY MULTIVITAMIN PO), Take by mouth daily, Disp: , Rfl:      Semaglutide-Weight Management (WEGOVY) 2.4 MG/0.75ML pen, Inject 2.4 mg Subcutaneous once a week, Disp: 9 mL, Rfl: 3     senna (SENOKOT) 8.6 MG tablet, Take 1 tablet by mouth daily, Disp: , Rfl:      vitamin A 96666 UNITS TABS, Take 1 tablet by mouth daily, Disp: , Rfl:      Vitamin D3 (CHOLECALCIFEROL) 125 MCG (5000 UT) tablet, Take by mouth daily, Disp: , Rfl:     Plan: restart phentermine. Restart Wegovy at the 0.25mg weekly and ramp up. Getting back to the gym will help with restorative sleep and energy. RD then annual f/up with labs. We discussed the importance of hydration, staying ahead of potential constipation, and consuming 3 protein containing, nutrient dense meals while taking  "GLP-1 RA medications.      Return in about 5 months (around 4/10/2024) for RD then me for annual RYGB follow up with labs in March or so.    Bariatric Surgery Review     Interim History/LifeChanges: not workin gout for three  weeks. Extremely tired. Wegovy last took in September.     Patient Concerns: weight gain, profound fatigue  Appetite (1-10): up  GERD: no    Medication changes: off of phentermine and Wegovy    Vitamin Intake:   B-12   SL   MVI  2/d   Vitamin D  5,000   Calcium        Other  iron              LABS: \"Reviewed  From March  Nausea no  Vomiting no  Constipation no  Diarrhea no  Rashes no  Hair Loss no  Calf tenderness no  Breathing difficulty no  Reactive Hypoglycemia avoids  Light Headedness no   Moods euthymic    12 point ROS as above and otherwise negative      Habits:  Alcohol: no  Tobacco: no  Caffeine no  NSAIDS no  Exercise Routine: none for 3 weeks. Getting back to the gym tomorrow  3 meals/day yes  Protein first yes  60grams/day  Water Separate from meals yes  Calorie Containing Beverages no  Restaurant eating/wk <2  Sleeping OK  Stress high  CPAP: NA  Contraception:   DEXA:not indicated for age <45    Social History     Social History     Socioeconomic History     Marital status:      Spouse name: Not on file     Number of children: Not on file     Years of education: Not on file     Highest education level: Not on file   Occupational History     Not on file   Tobacco Use     Smoking status: Never     Smokeless tobacco: Never   Substance and Sexual Activity     Alcohol use: Not Currently     Comment: Alcoholic Drinks/day: once per month     Drug use: Not Currently     Sexual activity: Not on file   Other Topics Concern     Parent/sibling w/ CABG, MI or angioplasty before 65F 55M? Not Asked   Social History Narrative    Single. Getting  in April 2023 after 13 years together. He has  4 children She has 1. Carlos Eduardo is 24 yo and living in [place]-going to school for Criminal " Justice.  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      Social Determinants of Health     Financial Resource Strain: Not on file   Food Insecurity: Not on file   Transportation Needs: Not on file   Physical Activity: Not on file   Stress: Not on file   Social Connections: Not on file   Interpersonal Safety: Not on file   Housing Stability: Not on file       Past Medical History     Past Medical History:   Diagnosis Date     Morbid obesity (H)     Prior to RYGB     Problem List     Patient Active Problem List   Diagnosis     Loss of hair     Dermatitis, seborrheic     Alopecia     Central centrifugal scarring alopecia     Medication monitoring encounter     Vitamin D deficiency     Pain of skin     Medications       Current Outpatient Medications:      phentermine (ADIPEX-P) 37.5 MG tablet, TAKE 1/2 TO 1 TABLET(18.75 TO 37.5 MG) BY MOUTH EVERY MORNING BEFORE BREAKFAST, Disp: 90 tablet, Rfl: 1     Semaglutide-Weight Management (WEGOVY) 0.25 MG/0.5ML pen, Inject 0.25 mg Subcutaneous once a week for 28 days, Disp: 2 mL, Rfl: 0     Semaglutide-Weight Management (WEGOVY) 0.5 MG/0.5ML pen, Inject 0.5 mg Subcutaneous once a week for 28 days, Disp: 2 mL, Rfl: 0     Semaglutide-Weight Management (WEGOVY) 1 MG/0.5ML pen, Inject 1 mg Subcutaneous once a week for 28 days, Disp: 2 mL, Rfl: 0     Semaglutide-Weight Management (WEGOVY) 1.7 MG/0.75ML pen, Inject 1.7 mg Subcutaneous once a week for 28 days, Disp: 3 mL, Rfl: 0     Semaglutide-Weight Management (WEGOVY) 2.4 MG/0.75ML pen, Inject 2.4 mg Subcutaneous once a week, Disp: 9 mL, Rfl: 3     calcium citrate-vitamin D (CITRACAL) 315-5 MG-MCG TABS per tablet, Take 2 tablets by mouth, Disp: , Rfl:      Cyanocobalamin (VITAMIN B-12) 5000 MCG SUBL, , Disp: , Rfl:      ergocalciferol (ERGOCALCIFEROL) 22452 UNITS capsule, Take 1 capsule (50,000 Units) by mouth once a week, Disp: 8 capsule, Rfl: 0     Ferrous Sulfate 324 (65 FE) MG TBEC,  "Take 1 tablet by mouth 3 times daily, Disp: 90 tablet, Rfl: 3     fluocinolone (DERMA-SMOOTHE/FS SCALP) 0.01 % external oil, Apply 100 mLs topically daily, Disp: 1 Bottle, Rfl: 6     hydrOXYzine (ATARAX) 25 MG tablet, Take 25 mg by mouth daily as needed, Disp: , Rfl:      Multiple Vitamin (DAILY MULTIVITAMIN PO), Take by mouth daily, Disp: , Rfl:      Semaglutide-Weight Management (WEGOVY) 2.4 MG/0.75ML pen, Inject 2.4 mg Subcutaneous once a week, Disp: 9 mL, Rfl: 3     senna (SENOKOT) 8.6 MG tablet, Take 1 tablet by mouth daily, Disp: , Rfl:      vitamin A 50697 UNITS TABS, Take 1 tablet by mouth daily, Disp: , Rfl:      Vitamin D3 (CHOLECALCIFEROL) 125 MCG (5000 UT) tablet, Take by mouth daily, Disp: , Rfl:    Surgical History     Past Surgical History  She has a past surgical history that includes Revision Gilma-En-Y (02/05/2007); Endometrial Ablation; and tubal ligation.    Objective-Exam     Constitutional:  Ht 1.638 m (5' 4.5\")   Wt 80.7 kg (178 lb)   BMI 30.08 kg/m    General:  Pleasant and in no acute distress     Psychiatric: alert and oriented X3, mood and affect normal    Counseling     We reviewed the important post op bariatric recommendations:  -eating 3 meals daily  -eating protein first, getting >60gm protein daily  -eating slowly, chewing food well  -avoiding/limiting calorie containing beverages  -drinking water 15-30 minutes before or after meals  -choosing wheat, not white with breads, crackers, pastas, criselda, bagels, tortillas, rice  -limiting restaurant or cafeteria eating to twice a week or less    We discussed the importance of restorative sleep and stress management in maintaining a healthy weight.  We discussed the National Weight Control Registry healthy weight maintenance strategies and ways to optimize metabolism.  We discussed the importance of physical activity including cardiovascular and strength training in maintaining a healthier weight.    We discussed the importance of life-long " vitamin supplementation and life-long  follow-up.    Kaci was reminded that, to avoid marginal ulcers she should avoid tobacco at all, alcohol in excess, caffeine in excess, and NSAIDS (unless indicated for cardioprotection or othewise and opposed by a PPI).    Ayala Gandhi MD, MD, [facility].  11/10/2023  11:16 AM  Total time spent on the date of this encounter doing: chart review, review of test results, patient visit, physical exam, education, counseling, developing plan of care, and documenting = 20 minutes.      Again, thank you for allowing me to participate in the care of your patient.        Sincerely,        Ayala Gandhi MD

## 2024-06-17 ENCOUNTER — MYC MEDICAL ADVICE (OUTPATIENT)
Dept: SURGERY | Facility: CLINIC | Age: 44
End: 2024-06-17
Payer: COMMERCIAL

## 2024-06-17 DIAGNOSIS — E66.811 CLASS 1 OBESITY WITH BODY MASS INDEX (BMI) OF 30.0 TO 30.9 IN ADULT, UNSPECIFIED OBESITY TYPE, UNSPECIFIED WHETHER SERIOUS COMORBIDITY PRESENT: ICD-10-CM

## 2024-06-18 ENCOUNTER — TELEPHONE (OUTPATIENT)
Dept: SURGERY | Facility: CLINIC | Age: 44
End: 2024-06-18
Payer: COMMERCIAL

## 2024-06-18 RX ORDER — SEMAGLUTIDE 1 MG/.5ML
1 INJECTION, SOLUTION SUBCUTANEOUS WEEKLY
Qty: 2 ML | Refills: 0 | Status: SHIPPED | OUTPATIENT
Start: 2024-08-13 | End: 2024-06-19

## 2024-06-18 RX ORDER — SEMAGLUTIDE 0.25 MG/.5ML
0.25 INJECTION, SOLUTION SUBCUTANEOUS WEEKLY
Qty: 2 ML | Refills: 0 | Status: SHIPPED | OUTPATIENT
Start: 2024-06-18

## 2024-06-18 RX ORDER — SEMAGLUTIDE 0.5 MG/.5ML
0.5 INJECTION, SOLUTION SUBCUTANEOUS WEEKLY
Qty: 2 ML | Refills: 0 | Status: SHIPPED | OUTPATIENT
Start: 2024-07-16 | End: 2024-06-18

## 2024-06-18 RX ORDER — SEMAGLUTIDE 0.25 MG/.5ML
0.25 INJECTION, SOLUTION SUBCUTANEOUS WEEKLY
Qty: 2 ML | Refills: 0 | Status: SHIPPED | OUTPATIENT
Start: 2024-06-18 | End: 2024-06-18

## 2024-06-18 RX ORDER — SEMAGLUTIDE 0.5 MG/.5ML
0.5 INJECTION, SOLUTION SUBCUTANEOUS WEEKLY
Qty: 2 ML | Refills: 0 | Status: SHIPPED | OUTPATIENT
Start: 2024-07-16

## 2024-06-18 RX ORDER — SEMAGLUTIDE 1 MG/.5ML
1 INJECTION, SOLUTION SUBCUTANEOUS WEEKLY
Qty: 2 ML | Refills: 0 | Status: SHIPPED | OUTPATIENT
Start: 2024-08-13 | End: 2024-06-18

## 2024-06-18 NOTE — TELEPHONE ENCOUNTER
Prior Authorization Retail Medication Request    Medication/Dose: Wegovy 0.25 mg weekly  New/renewal/insurance change PA/secondary ins. PA: New  Previously Tried and Failed:  Phentermine and Wegovy  Rationale:  Stopped Wegovy about 6 months ago due to supply issues and Wegovy not being covered with lack of weight loss.  Her current weight is 187 lb.    Insurance   Primary: [employer]  Insurance ID:  [id number]  Key: [id number]    Pharmacy Information (if different than what is on RX)  Name:  [facility]  Phone:  [phone]  Fax:[phone]

## 2024-06-19 VITALS — WEIGHT: 187 LBS | BODY MASS INDEX: 31.6 KG/M2

## 2024-06-19 RX ORDER — SEMAGLUTIDE 1 MG/.5ML
1 INJECTION, SOLUTION SUBCUTANEOUS WEEKLY
Qty: 2 ML | Refills: 0 | Status: SHIPPED | OUTPATIENT
Start: 2024-08-13 | End: 2024-09-10

## 2024-07-11 NOTE — TELEPHONE ENCOUNTER
Does the pt have a different name or last name? Not able to pull up the CMM key below. Called insurance, but they are not able to verify the pt's coverage due to non-matched information.

## 2024-07-11 NOTE — TELEPHONE ENCOUNTER
Prior Authorization Not Needed per Insurance    Medication: Wegovy 0.25 mg weekly   Per insurance, there is already a pa approval on file for this medication from 6/19/24- 1/19/25.  No additional PA needed.    Pharmacy Notified:  Yes- pharmacy received a paid claim.  Patient Notified:  No    Please close encounter when finished.    Thank you,  Central Prior Authorization Team  [phone]

## 2024-09-15 ENCOUNTER — HEALTH MAINTENANCE LETTER (OUTPATIENT)
Age: 44
End: 2024-09-15

## 2024-11-14 DIAGNOSIS — E66.811 CLASS 1 OBESITY WITH BODY MASS INDEX (BMI) OF 30.0 TO 30.9 IN ADULT, UNSPECIFIED OBESITY TYPE, UNSPECIFIED WHETHER SERIOUS COMORBIDITY PRESENT: ICD-10-CM

## 2024-11-14 RX ORDER — SEMAGLUTIDE 2.4 MG/.75ML
2.4 INJECTION, SOLUTION SUBCUTANEOUS WEEKLY
Qty: 9 ML | Refills: 1 | Status: SHIPPED | OUTPATIENT
Start: 2024-11-14 | End: 2025-11-14

## 2025-01-08 ENCOUNTER — MYC MEDICAL ADVICE (OUTPATIENT)
Dept: SURGERY | Facility: CLINIC | Age: 45
End: 2025-01-08
Payer: COMMERCIAL

## 2025-01-09 ENCOUNTER — TELEPHONE (OUTPATIENT)
Dept: SURGERY | Facility: CLINIC | Age: 45
End: 2025-01-09

## 2025-01-09 ENCOUNTER — VIRTUAL VISIT (OUTPATIENT)
Dept: SURGERY | Facility: CLINIC | Age: 45
End: 2025-01-09
Payer: COMMERCIAL

## 2025-01-09 VITALS — BODY MASS INDEX: 29.66 KG/M2 | HEIGHT: 65 IN | WEIGHT: 178 LBS

## 2025-01-09 DIAGNOSIS — Z88.6 NSAID SENSITIVITY: ICD-10-CM

## 2025-01-09 DIAGNOSIS — M54.31 RIGHT SIDED SCIATICA: ICD-10-CM

## 2025-01-09 DIAGNOSIS — K91.2 POSTOPERATIVE MALABSORPTION: Primary | ICD-10-CM

## 2025-01-09 DIAGNOSIS — R79.89 LOW SERUM VITAMIN A: ICD-10-CM

## 2025-01-09 DIAGNOSIS — E66.811 CLASS 1 OBESITY WITH BODY MASS INDEX (BMI) OF 30.0 TO 30.9 IN ADULT, UNSPECIFIED OBESITY TYPE, UNSPECIFIED WHETHER SERIOUS COMORBIDITY PRESENT: ICD-10-CM

## 2025-01-09 DIAGNOSIS — E66.9 OBESITY (BMI 30-39.9): ICD-10-CM

## 2025-01-09 RX ORDER — VITAMIN A 10000 UNIT
1 TABLET ORAL DAILY
Qty: 90 TABLET | Refills: 3 | Status: SHIPPED | OUTPATIENT
Start: 2025-01-09

## 2025-01-09 RX ORDER — PHENTERMINE HYDROCHLORIDE 37.5 MG/1
TABLET ORAL
Qty: 90 TABLET | Refills: 1 | Status: SHIPPED | OUTPATIENT
Start: 2025-01-09

## 2025-01-09 ASSESSMENT — PAIN SCALES - GENERAL: PAINLEVEL_OUTOF10: EXTREME PAIN (8)

## 2025-01-09 NOTE — TELEPHONE ENCOUNTER
Prior Authorization Retail Medication Request    Medication/Dose: Phentermine HCI 37.5mg Tablets  New/renewal/insurance change PA/secondary ins. PA:  Previously Tried and Failed:  Diet, Exercise, Weight loss surgery.     Key: [id number]    Pharmacy Information (if different than what is on RX)  Name:  [facility] [address] [place] [address]   Phone:  [phone]   Fax:    [phone]         Clinic Information  Preferred routing pool for dept communication: Bariatric Surgery Support Pool [place]

## 2025-01-09 NOTE — TELEPHONE ENCOUNTER
Prior Authorization Retail Medication Request    Medication/Dose: Zepbound Auto-injectors.   New/renewal/insurance change PA/secondary ins. PA:  Previously Tried and Failed:  Diet, Exercise, Weight Loss Surgery.   Rationale:  Changing from Wegovy 2.4 mg to Zepbound 7.5 mg and plan to ramp up to 15 mg.     Key for 7.5mg=[id number]  Key for 10 mg=[id number]  [place] for 12.5mg=[id number]  Key for 15mg=[id number]    Pharmacy Information (if different than what is on RX)  Name:  [facility] [address] [place] [address]   Phone:  [phone]   Fax:    [phone]       Clinic Information  Preferred routing pool for dept communication: Bariatric Surgery Support Pool [place]

## 2025-01-09 NOTE — PROGRESS NOTES
Bariatric Follow Up Visit with a History of Previous Bariatric Surgery     Date of visit: 1/9/2025  Physician: Ayala Gandhi MD, MD  Primary Care Provider:  Jeimy Abad   44 year old  female    Date of Surgery: 2/5/2007  Initial Weight: 260#  Initial BMI: 43.26  Today's Weight:   Wt Readings from Last 1 Encounters:   01/09/25 80.7 kg (178 lb)     Body mass index is 30.08 kg/m .      Assessment and Plan     Assessment: Kaci is a 44 year old year old female who is 18 years s/p  Gilma en Y Gastric Bypass with Dr. Claudia Gavin feels as if she has achieved the goals she hoped to accomplish through bariatric surgery and weight loss.    Encounter Diagnoses   Name Primary?    Postoperative malabsorption Yes    Low serum vitamin A     Right sided sciatica     Obesity (BMI 30-39.9)     NSAID sensitivity     Class 1 obesity with body mass index (BMI) of 30.0 to 30.9 in adult, unspecified obesity type, unspecified whether serious comorbidity present          Current Outpatient Medications:     omeprazole (PRILOSEC) 20 MG DR capsule, Take 1 capsule (20 mg) by mouth daily., Disp: 90 capsule, Rfl: 3    phentermine (ADIPEX-P) 37.5 MG tablet, TAKE 1/2 TO 1 TABLET(18.75 TO 37.5 MG) BY MOUTH EVERY MORNING BEFORE BREAKFAST, Disp: 90 tablet, Rfl: 1    tirzepatide-Weight Management (ZEPBOUND) 10 MG/0.5ML prefilled pen, Inject 0.5 mLs (10 mg) subcutaneously every 7 days for 28 days., Disp: 2 mL, Rfl: 0    tirzepatide-Weight Management (ZEPBOUND) 12.5 MG/0.5ML prefilled pen, Inject 0.5 mLs (12.5 mg) subcutaneously every 7 days for 28 days., Disp: 2 mL, Rfl: 0    tirzepatide-Weight Management (ZEPBOUND) 15 MG/0.5ML prefilled pen, Inject 0.5 mLs (15 mg) subcutaneously every 7 days., Disp: 6 mL, Rfl: 3    tirzepatide-Weight Management (ZEPBOUND) 7.5 MG/0.5ML prefilled pen, Inject 0.5 mLs (7.5 mg) subcutaneously every 7 days for 28 days., Disp: 2 mL, Rfl: 0    Vitamin A 3 MG (97136 UT) TABS, Take 1  "tablet by mouth daily., Disp: 90 tablet, Rfl: 3    calcium citrate-vitamin D (CITRACAL) 315-5 MG-MCG TABS per tablet, Take 2 tablets by mouth, Disp: , Rfl:     Cyanocobalamin (VITAMIN B-12) 5000 MCG SUBL, , Disp: , Rfl:     ergocalciferol (ERGOCALCIFEROL) 71190 UNITS capsule, Take 1 capsule (50,000 Units) by mouth once a week, Disp: 8 capsule, Rfl: 0    Ferrous Sulfate 324 (65 FE) MG TBEC, Take 1 tablet by mouth 3 times daily, Disp: 90 tablet, Rfl: 3    fluocinolone (DERMA-SMOOTHE/FS SCALP) 0.01 % external oil, Apply 100 mLs topically daily, Disp: 1 Bottle, Rfl: 6    hydrOXYzine (ATARAX) 25 MG tablet, Take 25 mg by mouth daily as needed, Disp: , Rfl:     Multiple Vitamin (DAILY MULTIVITAMIN PO), Take by mouth daily, Disp: , Rfl:     Semaglutide-Weight Management (WEGOVY) 2.4 MG/0.75ML pen, Inject 2.4 mg subcutaneously once a week., Disp: 9 mL, Rfl: 1    senna (SENOKOT) 8.6 MG tablet, Take 1 tablet by mouth daily, Disp: , Rfl:     Vitamin D3 (CHOLECALCIFEROL) 125 MCG (5000 UT) tablet, Take by mouth daily, Disp: , Rfl:      Plan: OSI for Right Sciatica. Change to Zepbound. Vitamin A RX. Always take derm meds with food. Trial omprazole. Continue to avoid tobacco products, etoh, and NSAIDS.    Follow up scheduled    Bariatric Surgery Review     Interim History/LifeChanges: Maintaining a 82# weight loss. Working out 6X/wk. Weights. Stress is high. Has not been able to work out for 4 months. Right Sciatica. Has taken phentermine and would like a refill. Has been seeing dermatology for 1 year and insurance kicked them off. They make her stomach burn. Minoxidil, spironolactone, dutasteride    Patient Concerns: weigh loss plateau  Appetite (1-10): up some  GERD: no    Medication changes: no    Vitamin Intake:   B-12   SL   MVI  Women's one a day (2)   Vitamin D  50,000 weekly   Calcium        Other  Ran out of vitamin A              LABS: \"Reviewed  A low  Nausea no  Vomiting no  Constipation no  Diarrhea no  Rashes no  Hair " Loss yes  Calf tenderness no  Breathing difficulty no  Reactive Hypoglycemia no  Light Headedness no   Moods no    12 point ROS as above and otherwise negative      Habits:  Alcohol: no  Tobacco: no  Caffeine no  NSAIDS no  Exercise Routine: typically 6 X/wk  3 meals/day 3  Protein first yes  60 grams/day  Water Separate from meals yes  Calorie Containing Beverages no  Restaurant eating/wk <2  Sleeping OK  Stress high  CPAP: na  Contraception: TL  DEXA:NA    Social History     Social History     Socioeconomic History    Marital status:      Spouse name: Not on file    Number of children: Not on file    Years of education: Not on file    Highest education level: Not on file   Occupational History    Not on file   Tobacco Use    Smoking status: Never    Smokeless tobacco: Never   Vaping Use    Vaping status: Never Used   Substance and Sexual Activity    Alcohol use: Not Currently     Comment: Alcoholic Drinks/day: once per month    Drug use: Not Currently    Sexual activity: Not on file   Other Topics Concern    Parent/sibling w/ CABG, MI or angioplasty before 65F 55M? Not Asked   Social History Narrative    Single. Getting  in April 2023 after 13 years together. He has  4 children She has 1. Carlos Eduardo is 22 yo and living in [place]-going to school for Criminal Justice.  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      Social Drivers of Health     Financial Resource Strain: High Risk (1/1/2022)    [facility]    Financial Resource Strain     Difficulty of Paying Living Expenses: Not on file     Difficulty of Paying Living Expenses: Not on file   Food Insecurity: Not on File (9/26/2024)    Received from [facility]    Food Insecurity     Food: 0   Transportation Needs: Not on File (10/17/2021)    [facility]    Transportation Needs     Transportation: 0   Physical  Activity: Not on File (10/17/2021)    [facility] from [facility]    Physical Activity     Physical Activity: 0   Stress: Not on File (10/17/2021)    [facility] from [facility]    Stress     Stress: 0   Social Connections: Not on File (2024)    Received from [facility]    Social Connections     Connectedness: 0   Interpersonal Safety: Not on file   Housing Stability: Not on File (10/17/2021)    [facility] from [facility]    Housing Stability     Housin       Past Medical History     Past Medical History:   Diagnosis Date    Morbid obesity (H)     Prior to RYGB     Problem List     Patient Active Problem List   Diagnosis    Loss of hair    Dermatitis, seborrheic    Alopecia    Central centrifugal scarring alopecia    Medication monitoring encounter    Vitamin D deficiency    Pain of skin     Medications       Current Outpatient Medications:     omeprazole (PRILOSEC) 20 MG DR capsule, Take 1 capsule (20 mg) by mouth daily., Disp: 90 capsule, Rfl: 3    phentermine (ADIPEX-P) 37.5 MG tablet, TAKE 1/2 TO 1 TABLET(18.75 TO 37.5 MG) BY MOUTH EVERY MORNING BEFORE BREAKFAST, Disp: 90 tablet, Rfl: 1    tirzepatide-Weight Management (ZEPBOUND) 10 MG/0.5ML prefilled pen, Inject 0.5 mLs (10 mg) subcutaneously every 7 days for 28 days., Disp: 2 mL, Rfl: 0    tirzepatide-Weight Management (ZEPBOUND) 12.5 MG/0.5ML prefilled pen, Inject 0.5 mLs (12.5 mg) subcutaneously every 7 days for 28 days., Disp: 2 mL, Rfl: 0    tirzepatide-Weight Management (ZEPBOUND) 15 MG/0.5ML prefilled pen, Inject 0.5 mLs (15 mg) subcutaneously every 7 days., Disp: 6 mL, Rfl: 3    tirzepatide-Weight Management (ZEPBOUND) 7.5 MG/0.5ML prefilled pen, Inject 0.5 mLs (7.5 mg) subcutaneously every 7 days for 28 days., Disp: 2 mL, Rfl: 0    Vitamin A 3 MG (26790 UT) TABS, Take 1 tablet by mouth daily., Disp: 90 tablet, Rfl: 3    calcium citrate-vitamin D (CITRACAL) 315-5 MG-MCG TABS per tablet, Take 2 tablets by mouth, Disp: , Rfl:     Cyanocobalamin (VITAMIN  "B-12) 5000 MCG SUBL, , Disp: , Rfl:     ergocalciferol (ERGOCALCIFEROL) 39608 UNITS capsule, Take 1 capsule (50,000 Units) by mouth once a week, Disp: 8 capsule, Rfl: 0    Ferrous Sulfate 324 (65 FE) MG TBEC, Take 1 tablet by mouth 3 times daily, Disp: 90 tablet, Rfl: 3    fluocinolone (DERMA-SMOOTHE/FS SCALP) 0.01 % external oil, Apply 100 mLs topically daily, Disp: 1 Bottle, Rfl: 6    hydrOXYzine (ATARAX) 25 MG tablet, Take 25 mg by mouth daily as needed, Disp: , Rfl:     Multiple Vitamin (DAILY MULTIVITAMIN PO), Take by mouth daily, Disp: , Rfl:     Semaglutide-Weight Management (WEGOVY) 2.4 MG/0.75ML pen, Inject 2.4 mg subcutaneously once a week., Disp: 9 mL, Rfl: 1    senna (SENOKOT) 8.6 MG tablet, Take 1 tablet by mouth daily, Disp: , Rfl:     Vitamin D3 (CHOLECALCIFEROL) 125 MCG (5000 UT) tablet, Take by mouth daily, Disp: , Rfl:    Surgical History     Past Surgical History  She has a past surgical history that includes Revision Gilma-En-Y (02/05/2007); Endometrial Ablation; and tubal ligation.    Objective-Exam     Constitutional:  Ht 1.638 m (5' 4.5\")   Wt 80.7 kg (178 lb)   BMI 30.08 kg/m      General:  Pleasant and in no acute distress   Neck:  Supple, No LAD, No thyromegaly, No carotid bruits appreciated  Respiratory: Normal respiratory effort, no cough, wheezes or crackles  Musculoskeletal: muscle mass WNL  Neurological: No tremor, normal gait  Psychiatric: alert and oriented X3, mood and affect normal    Counseling     We reviewed the important post op bariatric recommendations:  -eating 3 meals daily  -eating protein first, getting >60gm protein daily  -eating slowly, chewing food well  -avoiding/limiting calorie containing beverages  -drinking water 15-30 minutes before or after meals  -choosing wheat, not white with breads, crackers, pastas, criselda, bagels, tortillas, rice  -limiting restaurant or cafeteria eating to twice a week or less    We discussed the importance of restorative sleep and stress " management in maintaining a healthy weight.  We discussed the National Weight Control Registry healthy weight maintenance strategies and ways to optimize metabolism.  We discussed the importance of physical activity including cardiovascular and strength training in maintaining a healthier weight.    We discussed the importance of life-long vitamin supplementation and life-long  follow-up.    Kaci was reminded that, to avoid marginal ulcers she should avoid tobacco at all, alcohol in excess, caffeine in excess, and NSAIDS (unless indicated for cardioprotection or othewise and opposed by a PPI).    Ayala Gandhi MD, MD, [facility].  1/9/2025  1:27 PM      No images are attached to the encounter.  Total time spent on the date of this encounter doing: chart review, review of test results, patient visit, physical exam, education, counseling, developing plan of care, and documenting = 30 minutes.

## 2025-01-09 NOTE — LETTER
1/9/2025      Kaci Gavin  [address] [address]      Dear Colleague,    Thank you for referring your patient, Kaci Gavin, to the [facility] [place]. Please see a copy of my visit note below.    Bariatric Follow Up Visit with a History of Previous Bariatric Surgery     Date of visit: 1/9/2025  Physician: Ayala Gandhi MD, MD  Primary Care Provider:  Jeimy Abad  Kaci Gavin   44 year old  female    Date of Surgery: 2/5/2007  Initial Weight: 260#  Initial BMI: 43.26  Today's Weight:   Wt Readings from Last 1 Encounters:   01/09/25 80.7 kg (178 lb)     Body mass index is 30.08 kg/m .      Assessment and Plan     Assessment: Kaci is a 44 year old year old female who is 18 years s/p  Gilma en Y Gastric Bypass with Dr. Taylor  Kaci Gavin feels as if she has achieved the goals she hoped to accomplish through bariatric surgery and weight loss.    Encounter Diagnoses   Name Primary?     Postoperative malabsorption Yes     Low serum vitamin A      Right sided sciatica      Obesity (BMI 30-39.9)      NSAID sensitivity      Class 1 obesity with body mass index (BMI) of 30.0 to 30.9 in adult, unspecified obesity type, unspecified whether serious comorbidity present          Current Outpatient Medications:      omeprazole (PRILOSEC) 20 MG DR capsule, Take 1 capsule (20 mg) by mouth daily., Disp: 90 capsule, Rfl: 3     phentermine (ADIPEX-P) 37.5 MG tablet, TAKE 1/2 TO 1 TABLET(18.75 TO 37.5 MG) BY MOUTH EVERY MORNING BEFORE BREAKFAST, Disp: 90 tablet, Rfl: 1     tirzepatide-Weight Management (ZEPBOUND) 10 MG/0.5ML prefilled pen, Inject 0.5 mLs (10 mg) subcutaneously every 7 days for 28 days., Disp: 2 mL, Rfl: 0     tirzepatide-Weight Management (ZEPBOUND) 12.5 MG/0.5ML prefilled pen, Inject 0.5 mLs (12.5 mg) subcutaneously every 7 days for 28 days., Disp: 2 mL, Rfl: 0     tirzepatide-Weight Management (ZEPBOUND) 15 MG/0.5ML prefilled  pen, Inject 0.5 mLs (15 mg) subcutaneously every 7 days., Disp: 6 mL, Rfl: 3     tirzepatide-Weight Management (ZEPBOUND) 7.5 MG/0.5ML prefilled pen, Inject 0.5 mLs (7.5 mg) subcutaneously every 7 days for 28 days., Disp: 2 mL, Rfl: 0     Vitamin A 3 MG (07697 UT) TABS, Take 1 tablet by mouth daily., Disp: 90 tablet, Rfl: 3     calcium citrate-vitamin D (CITRACAL) 315-5 MG-MCG TABS per tablet, Take 2 tablets by mouth, Disp: , Rfl:      Cyanocobalamin (VITAMIN B-12) 5000 MCG SUBL, , Disp: , Rfl:      ergocalciferol (ERGOCALCIFEROL) 50500 UNITS capsule, Take 1 capsule (50,000 Units) by mouth once a week, Disp: 8 capsule, Rfl: 0     Ferrous Sulfate 324 (65 FE) MG TBEC, Take 1 tablet by mouth 3 times daily, Disp: 90 tablet, Rfl: 3     fluocinolone (DERMA-SMOOTHE/FS SCALP) 0.01 % external oil, Apply 100 mLs topically daily, Disp: 1 Bottle, Rfl: 6     hydrOXYzine (ATARAX) 25 MG tablet, Take 25 mg by mouth daily as needed, Disp: , Rfl:      Multiple Vitamin (DAILY MULTIVITAMIN PO), Take by mouth daily, Disp: , Rfl:      Semaglutide-Weight Management (WEGOVY) 2.4 MG/0.75ML pen, Inject 2.4 mg subcutaneously once a week., Disp: 9 mL, Rfl: 1     senna (SENOKOT) 8.6 MG tablet, Take 1 tablet by mouth daily, Disp: , Rfl:      Vitamin D3 (CHOLECALCIFEROL) 125 MCG (5000 UT) tablet, Take by mouth daily, Disp: , Rfl:      Plan: OSI for Right Sciatica. Change to Zepbound. Vitamin A RX. Always take derm meds with food. Trial omprazole. Continue to avoid tobacco products, etoh, and NSAIDS.    Follow up scheduled    Bariatric Surgery Review     Interim History/LifeChanges: Maintaining a 82# weight loss. Working out 6X/wk. Weights. Stress is high. Has not been able to work out for 4 months. Right Sciatica. Has taken phentermine and would like a refill. Has been seeing dermatology for 1 year and insurance kicked them off. They make her stomach burn. Minoxidil, spironolactone, dutasteride    Patient Concerns: weigh loss plateau  Appetite  "(1-10): up some  GERD: no    Medication changes: no    Vitamin Intake:   B-12   SL   MVI  Women's one a day (2)   Vitamin D  50,000 weekly   Calcium        Other  Ran out of vitamin A              LABS: \"Reviewed  A low  Nausea no  Vomiting no  Constipation no  Diarrhea no  Rashes no  Hair Loss yes  Calf tenderness no  Breathing difficulty no  Reactive Hypoglycemia no  Light Headedness no   Moods no    12 point ROS as above and otherwise negative      Habits:  Alcohol: no  Tobacco: no  Caffeine no  NSAIDS no  Exercise Routine: typically 6 X/wk  3 meals/day 3  Protein first yes  60 grams/day  Water Separate from meals yes  Calorie Containing Beverages no  Restaurant eating/wk <2  Sleeping OK  Stress high  CPAP: na  Contraception: TL  DEXA:NA    Social History     Social History     Socioeconomic History     Marital status:      Spouse name: Not on file     Number of children: Not on file     Years of education: Not on file     Highest education level: Not on file   Occupational History     Not on file   Tobacco Use     Smoking status: Never     Smokeless tobacco: Never   Vaping Use     Vaping status: Never Used   Substance and Sexual Activity     Alcohol use: Not Currently     Comment: Alcoholic Drinks/day: once per month     Drug use: Not Currently     Sexual activity: Not on file   Other Topics Concern     Parent/sibling w/ CABG, MI or angioplasty before 65F 55M? Not Asked   Social History Narrative    Single. Getting  in April 2023 after 13 years together. He has  4 children She has 1. Carlos Eduardo is 24 yo and living in [place]-going to school for Criminal Justice.  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      Social Drivers of Health     Financial Resource Strain: High Risk (1/1/2022)    [facility]    Financial Resource Strain      Difficulty of Paying Living " Expenses: Not on file      Difficulty of Paying Living Expenses: Not on file   Food Insecurity: Not on File (2024)    Received from [facility]    Food Insecurity      Food: 0   Transportation Needs: Not on File (10/17/2021)    [facility]    Transportation Needs      Transportation: 0   Physical Activity: Not on File (10/17/2021)    [facility] from [facility]    Physical Activity      Physical Activity: 0   Stress: Not on File (10/17/2021)    [facility] from [facility]    Stress      Stress: 0   Social Connections: Not on File (2024)    Received from [facility]    Social Connections      Connectedness: 0   Interpersonal Safety: Not on file   Housing Stability: Not on File (10/17/2021)    [facility] from [facility]    Housing Stability      Housin       Past Medical History     Past Medical History:   Diagnosis Date     Morbid obesity (H)     Prior to RYGB     Problem List     Patient Active Problem List   Diagnosis     Loss of hair     Dermatitis, seborrheic     Alopecia     Central centrifugal scarring alopecia     Medication monitoring encounter     Vitamin D deficiency     Pain of skin     Medications       Current Outpatient Medications:      omeprazole (PRILOSEC) 20 MG DR capsule, Take 1 capsule (20 mg) by mouth daily., Disp: 90 capsule, Rfl: 3     phentermine (ADIPEX-P) 37.5 MG tablet, TAKE 1/2 TO 1 TABLET(18.75 TO 37.5 MG) BY MOUTH EVERY MORNING BEFORE BREAKFAST, Disp: 90 tablet, Rfl: 1     tirzepatide-Weight Management (ZEPBOUND) 10 MG/0.5ML prefilled pen, Inject 0.5 mLs (10 mg) subcutaneously every 7 days for 28 days., Disp: 2 mL, Rfl: 0     tirzepatide-Weight Management (ZEPBOUND) 12.5 MG/0.5ML prefilled pen, Inject 0.5 mLs (12.5 mg) subcutaneously every 7 days for 28 days., Disp: 2 mL, Rfl: 0     tirzepatide-Weight Management (ZEPBOUND) 15 MG/0.5ML prefilled pen, Inject 0.5 mLs (15 mg) subcutaneously every 7 days., Disp: 6 mL, Rfl: 3     tirzepatide-Weight Management (ZEPBOUND) 7.5  "MG/0.5ML prefilled pen, Inject 0.5 mLs (7.5 mg) subcutaneously every 7 days for 28 days., Disp: 2 mL, Rfl: 0     Vitamin A 3 MG (01986 UT) TABS, Take 1 tablet by mouth daily., Disp: 90 tablet, Rfl: 3     calcium citrate-vitamin D (CITRACAL) 315-5 MG-MCG TABS per tablet, Take 2 tablets by mouth, Disp: , Rfl:      Cyanocobalamin (VITAMIN B-12) 5000 MCG SUBL, , Disp: , Rfl:      ergocalciferol (ERGOCALCIFEROL) 86242 UNITS capsule, Take 1 capsule (50,000 Units) by mouth once a week, Disp: 8 capsule, Rfl: 0     Ferrous Sulfate 324 (65 FE) MG TBEC, Take 1 tablet by mouth 3 times daily, Disp: 90 tablet, Rfl: 3     fluocinolone (DERMA-SMOOTHE/FS SCALP) 0.01 % external oil, Apply 100 mLs topically daily, Disp: 1 Bottle, Rfl: 6     hydrOXYzine (ATARAX) 25 MG tablet, Take 25 mg by mouth daily as needed, Disp: , Rfl:      Multiple Vitamin (DAILY MULTIVITAMIN PO), Take by mouth daily, Disp: , Rfl:      Semaglutide-Weight Management (WEGOVY) 2.4 MG/0.75ML pen, Inject 2.4 mg subcutaneously once a week., Disp: 9 mL, Rfl: 1     senna (SENOKOT) 8.6 MG tablet, Take 1 tablet by mouth daily, Disp: , Rfl:      Vitamin D3 (CHOLECALCIFEROL) 125 MCG (5000 UT) tablet, Take by mouth daily, Disp: , Rfl:    Surgical History     Past Surgical History  She has a past surgical history that includes Revision Gilma-En-Y (02/05/2007); Endometrial Ablation; and tubal ligation.    Objective-Exam     Constitutional:  Ht 1.638 m (5' 4.5\")   Wt 80.7 kg (178 lb)   BMI 30.08 kg/m      General:  Pleasant and in no acute distress   Neck:  Supple, No LAD, No thyromegaly, No carotid bruits appreciated  Respiratory: Normal respiratory effort, no cough, wheezes or crackles  Musculoskeletal: muscle mass WNL  Neurological: No tremor, normal gait  Psychiatric: alert and oriented X3, mood and affect normal    Counseling     We reviewed the important post op bariatric recommendations:  -eating 3 meals daily  -eating protein first, getting >60gm protein daily  -eating " slowly, chewing food well  -avoiding/limiting calorie containing beverages  -drinking water 15-30 minutes before or after meals  -choosing wheat, not white with breads, crackers, pastas, criselda, bagels, tortillas, rice  -limiting restaurant or cafeteria eating to twice a week or less    We discussed the importance of restorative sleep and stress management in maintaining a healthy weight.  We discussed the National Weight Control Registry healthy weight maintenance strategies and ways to optimize metabolism.  We discussed the importance of physical activity including cardiovascular and strength training in maintaining a healthier weight.    We discussed the importance of life-long vitamin supplementation and life-long  follow-up.    Kaci was reminded that, to avoid marginal ulcers she should avoid tobacco at all, alcohol in excess, caffeine in excess, and NSAIDS (unless indicated for cardioprotection or othewise and opposed by a PPI).    Ayala Gandhi MD, MD, [facility].  1/9/2025  1:27 PM      No images are attached to the encounter.  Total time spent on the date of this encounter doing: chart review, review of test results, patient visit, physical exam, education, counseling, developing plan of care, and documenting = 30 minutes.    Virtual Visit Details    Type of service:  Video Visit     Originating Location (pt. Location): Other work  {PROVIDER LOCATION On-site should be selected for visits conducted from your clinic location or adjoining [facility] hospital, academic office, or other nearby [facility] building. Off-site should be selected for all other provider locations, including home:[id number]}  Distant Location (provider location):  On-site  Platform used for Video Visit: AmWell combined with Doximity due to audio input problem      Again, thank you for allowing me to participate in the care of your patient.        Sincerely,        Ayala Gandhi MD    Electronically  signed

## 2025-01-09 NOTE — NURSING NOTE
Current patient location: work     Is the patient currently in the state of [place]? YES    Visit mode: VIDEO    If the visit is dropped, the patient can be reconnected by:VIDEO VISIT: Text to cell phone:   Telephone Information:   Mobile [phone]       Will anyone else be joining the visit? NO  (If patient encounters technical issues they should call [phone])    Are changes needed to the allergy or medication list? Pt stated no changes to allergies and Pt stated no med changes    Are refills needed on medications prescribed by this physician? Discuss with provider    Rooming Documentation:  Questionnaire(s) completed      Reason for visit: RECHECK    Wt other than 24 hrs:    Pain more than one location: no    Ju BA

## 2025-01-09 NOTE — PROGRESS NOTES
Virtual Visit Details    Type of service:  Video Visit     Originating Location (pt. Location): Other work    Distant Location (provider location):  On-site  Platform used for Video Visit: AmWell combined with Doximity due to audio input problem

## 2025-01-12 NOTE — TELEPHONE ENCOUNTER
Per call to pharmacy - verified insurance they had on file - started another PA in [facility] and it won't go through:        Pharmacy now closed (forgot to ask them to verify pt address, name, )     Sent Pt a My chart message to verify also.     PER PA STARTED BY PHARMACY BUT WON'T GO THROUGH.     DAVID: [id number]  Member ID: [id number]  BIN: [id number]  PCN: [id number]  GROUP: [id number]    Val Campbell Prior Authorization Team  P: [phone]

## 2025-01-12 NOTE — TELEPHONE ENCOUNTER
[facility] Pharm[facility] Prior Authorization Team   Phone: [phone]    PA Initiation    Medication: Phentermine HCI 37.5mg Tablets  Insurance Company: [employer] - Phone [phone] Fax [phone]  Pharmacy Filling the Rx: [facility] [address] [place] [address]  Filling Pharmacy Phone: [phone]  Filling Pharmacy Fax: [phone]  Start Date: 1/12/2025

## 2025-01-13 NOTE — TELEPHONE ENCOUNTER
Prior Authorization Approval    Authorization Effective Date: 1/13/2025  Authorization Expiration Date: 4/13/2025  Medication: Phentermine HCI 37.5mg Tablets - APPROVED  Approved Dose/Quantity:    Reference #:     Insurance Company: [employer] [phone] Fax [phone]  Expected CoPay:       CoPay Card Available:      Foundation Assistance Needed:    Which Pharmacy is filling the prescription (Not needed for infusion/clinic administered): [facility] [address] [place] [address]  Pharmacy Notified:  YES  Patient Notified:  YES

## 2025-01-13 NOTE — TELEPHONE ENCOUNTER
Received reply from patient with images of her insurance cards - patient recently got  and last name is now Edvin.     Even with updated last name still wont go through CMM:

## 2025-01-14 NOTE — TELEPHONE ENCOUNTER
PA Initiation    Medication: ZEPBOUND 7.5 MG/0.5ML SC SOAJ  Insurance Company: [employer] - Phone [phone] Fax [phone]  Pharmacy Filling the Rx: [facility] [address] [place], [place] [address] [facility]  Filling Pharmacy Phone: [phone]  Filling Pharmacy Fax:    Start Date: 1/14/2025

## 2025-01-16 NOTE — TELEPHONE ENCOUNTER
Prior Authorization Approval    Medication: ZEPBOUND 7.5 MG/0.5ML SC SOAJ  Authorization Effective Date: 1/14/2025  Authorization Expiration Date: 1/14/2026  Approved Dose/Quantity:   Reference #:     Insurance Company: [email or web address] [phone] Fax [phone]  Expected CoPay: $    CoPay Card Available:      Financial Assistance Needed:   Which Pharmacy is filling the prescription: [facility] [address] [place] [address]  Pharmacy Notified: Y  Patient Notified: Instructed pharmacy to notify patient once order is ready.